# Patient Record
Sex: MALE | Race: WHITE | NOT HISPANIC OR LATINO | Employment: OTHER | URBAN - METROPOLITAN AREA
[De-identification: names, ages, dates, MRNs, and addresses within clinical notes are randomized per-mention and may not be internally consistent; named-entity substitution may affect disease eponyms.]

---

## 2017-03-14 ENCOUNTER — OFFICE VISIT (OUTPATIENT)
Dept: FAMILY MEDICINE | Facility: CLINIC | Age: 62
End: 2017-03-14
Payer: COMMERCIAL

## 2017-03-14 VITALS
BODY MASS INDEX: 26.72 KG/M2 | WEIGHT: 197 LBS | SYSTOLIC BLOOD PRESSURE: 115 MMHG | TEMPERATURE: 98.3 F | HEART RATE: 61 BPM | OXYGEN SATURATION: 96 % | DIASTOLIC BLOOD PRESSURE: 67 MMHG

## 2017-03-14 DIAGNOSIS — Z13.5 SCREENING FOR EYE CONDITION: ICD-10-CM

## 2017-03-14 DIAGNOSIS — G60.9 IDIOPATHIC PERIPHERAL NEUROPATHY: ICD-10-CM

## 2017-03-14 DIAGNOSIS — Z12.11 SCREEN FOR COLON CANCER: Primary | ICD-10-CM

## 2017-03-14 DIAGNOSIS — R73.9 ELEVATED BLOOD SUGAR: ICD-10-CM

## 2017-03-14 LAB — HBA1C MFR BLD: 5.8 % (ref 4.3–6)

## 2017-03-14 PROCEDURE — 83036 HEMOGLOBIN GLYCOSYLATED A1C: CPT | Performed by: FAMILY MEDICINE

## 2017-03-14 PROCEDURE — 99213 OFFICE O/P EST LOW 20 MIN: CPT | Performed by: FAMILY MEDICINE

## 2017-03-14 PROCEDURE — 36415 COLL VENOUS BLD VENIPUNCTURE: CPT | Performed by: FAMILY MEDICINE

## 2017-03-14 NOTE — NURSING NOTE
Chief Complaint   Patient presents with     NEUROPATHY     concern about neuropathy       Initial /67 (Cuff Size: Adult Large)  Pulse 61  Temp 98.3  F (36.8  C) (Oral)  Wt 197 lb (89.4 kg)  SpO2 96%  BMI 26.72 kg/m2 Estimated body mass index is 26.72 kg/(m^2) as calculated from the following:    Height as of 11/30/16: 6' (1.829 m).    Weight as of this encounter: 197 lb (89.4 kg).  Medication Reconciliation: leslie Ortiz MA

## 2017-03-14 NOTE — PROGRESS NOTES
Navneet,  I have reviewed the results of the laboratory tests that we recently ordered. All of the lab work performed was normal or considered normal for you.  Sincerely,   Bello Bruno

## 2017-03-14 NOTE — PATIENT INSTRUCTIONS
Your provider has referred you to: UMP: [facility] [phone]   [email or web address]  General Neurology and neuroopathy      Your provider has referred you to:  FMG: [facility] [phone]   [email or web address]      You will be contacted regarding the colonoscopy

## 2017-03-14 NOTE — PROGRESS NOTES
SUBJECTIVE:                                                    Navneet Toussaint is a 61 year old male who presents to clinic today for the following health issues:      Concern about neuropathy- increased burning and aching in feet, ankles, upper legs and hands.       Reviewed and updated as needed this visit by clinical staff  Tobacco  Allergies  Meds  Med Hx  Surg Hx  Fam Hx  Soc Hx      Reviewed and updated as needed this visit by Provider       --------------------------------------------------------------------------------------------------------------------------------------    SUBJECTIVE:  Navneet Toussaint is a 61 year old male who scheduled an appointment to discuss the following issues:  He was diagnosed with idiopathic neuropathy about 6 years ago.     He has had elevated blood sugar(s) in the past but no diabetes     He reports that the pain has flared up 2 day(s) ago and spread up his ankles and he also had an achy type pain in his bilateral(ly)  groin.    The pain in the feet is a burning and needle like pain.   The pain in the legs is more of an achy quality.    He has had a flare of his neuropathy a number of times and it usually settles down after 3-4 day(s).       Past Medical, social, family histories, medications, and allergies reviewed and updated      Current Outpatient Prescriptions:      TraMADol HCl 50 MG TBDP, Take 1-2 tablets by mouth every 4 hours as needed Maximum is 8 tabs in a day, Disp: 270 tablet, Rfl: 0     tadalafil (CIALIS) 20 MG tablet, Take 1 tablet (20 mg) by mouth daily as needed for erectile dysfunction, Disp: 10 tablet, Rfl: 3     fluticasone (FLONASE) 50 MCG/ACT nasal spray, Spray 2 sprays into both nostrils daily, Disp: 16 g, Rfl: 6     levothyroxine (SYNTHROID, LEVOTHROID) 112 MCG tablet, Take 1 tablet (112 mcg) by mouth daily, Disp: 90 tablet, Rfl: 2     tamsulosin (FLOMAX) 0.4 MG 24 hr capsule, Take 1 capsule (0.4 mg) by mouth daily, Disp: 90 capsule, Rfl: 1      finasteride (PROPECIA) 1 MG tablet, Take 1 tablet (1 mg) by mouth daily, Disp: 90 tablet, Rfl: 3     gabapentin (NEURONTIN) 800 MG tablet, Take 1 tablet (800 mg) by mouth 3 times daily, Disp: 270 tablet, Rfl: 3     gabapentin (NEURONTIN) 400 MG capsule, Take 1 capsule (400 mg) by mouth At Bedtime, Disp: 90 capsule, Rfl: 3     omeprazole (PRILOSEC) 40 MG capsule, Take 1 capsule (40 mg) by mouth daily Take 30-60 minutes before a meal., Disp: 30 capsule, Rfl: 4     ORDER FOR DME, Respironics REMSTAR 60 Series Auto CPAP 5-15cm H20, Nuance pillows w/medium pillows, Disp: , Rfl:      ORDER FOR DME, Respironics Auto CPAP 5-15cm H2O, Disp: , Rfl:     OBJECTIVE:  /67 (Cuff Size: Adult Large)  Pulse 61  Temp 98.3  F (36.8  C) (Oral)  Wt 197 lb (89.4 kg)  SpO2 96%  BMI 26.72 kg/m2    EXAM:  GENERAL APPEARANCE: healthy, alert and no distress  EXTREMITIES: intact monofilamet testing in the feet except the toes and the soles of the feet.  Normal DP pulse and there was brisk capillary refill(s)/          Results for orders placed or performed in visit on 11/30/16   Pain Drug Scr UR W Rptd Meds   Result Value Ref Range    Pain Drug SCR UR W RPTD Meds       FINAL  (Note)  ====================================================================  TOXASSURE COMP DRUG ANALYSIS,UR  ====================================================================  Test                             Result       Flag       Units  Drug Present and Declared for Prescription Verification   Tramadol                       PRESENT      EXPECTED   O-Desmethyltramadol            PRESENT      EXPECTED   N-Desmethyltramadol            PRESENT      EXPECTED    Source of tramadol is a prescription medication.    O-desmethyltramadol and N-desmethyltramadol are expected    metabolites of tramadol.     Gabapentin                     PRESENT      EXPECTED  ====================================================================  Test                      Result    Flag    Units      Ref Range   Creatinine              30               mg/dL      >=20  ====================================================================  Declared Medications:  The flagging and interpretation on this re port are based on the  following declared medications.  Unexpected results may arise from  inaccuracies in the declared medications.    **Note: The testing scope of this panel includes these medications:    Gabapentin  Tramadol    **Note: The testing scope of this panel does not include following  reported medications:    Finasteride (Propecia)  Fluticasone (Flonase)  Levothyroxine  Omeprazole (Prilosec)  Tadalafil (Cialis)  Tamsulosin (Flomax)  ====================================================================  For clinical consultation, please call [phone].  ====================================================================  Analysis performed by [employer] [place] [address]           ASSESSMENT/PLAN:  (Z12.11) Screen for colon cancer  (primary encounter diagnosis)  Comment:   Plan: GASTROENTEROLOGY ADULT REF PROCEDURE ONLY            (G60.9) Idiopathic peripheral neuropathy  Comment:   Plan: Hemoglobin A1c, NEUROLOGY ADULT REFERRAL            (R73.9) Elevated blood sugar  Comment:   Plan:     (Z13.5) Screening for eye condition  Comment:   Plan: OPTOMETRY REFERRAL

## 2017-03-14 NOTE — MR AVS SNAPSHOT
After Visit Summary   3/14/2017    Navneet Toussaint    MRN: [id number]           Patient Information     Date Of Birth          1955        Visit Information        Provider Department      3/14/2017 10:15 AM Bello Bruno MD [facility]        Today's Diagnoses     Screen for colon cancer    -  1    Idiopathic peripheral neuropathy        Elevated blood sugar        Screening for eye condition          Care Instructions    Your provider has referred you to: [facility]: Neurology [facility] [phone]   [email or web address]  General Neurology and neuroopathy      Your provider has referred you to:  FMG: [facility] [phone]   [email or web address]      You will be contacted regarding the colonoscopy        Follow-ups after your visit        Additional Services     GASTROENTEROLOGY ADULT REF PROCEDURE ONLY       Last Lab Result: Creatinine (mg/dL)       Date                     Value                 10/09/2015               1.03             ----------  Body mass index is 26.72 kg/(m^2).      Patient will be contacted to schedule procedure.     Please be aware that coverage of these services is subject to the terms and limitations of your health insurance plan.  Call member services at your health plan with any benefit or coverage questions.  Any procedures must be performed at a [place] facility OR coordinated by your clinic's referral office.    Please bring the following with you to your appointment:    (1) Any X-Rays, CTs or MRIs which have been performed.  Contact the facility where they were done to arrange for  prior to your scheduled appointment.    (2) List of current medications   (3) This referral request   (4) Any documents/labs given to you for this referral            NEUROLOGY ADULT REFERRAL       Your provider has referred you to: [facility]: Neurology [facility] [phone]   [email or web address]  General Neurology and neuroopathy    Reason for Referral: follow up on neuropathy    Please be aware that coverage of these services is subject to the terms and limitations of your health insurance plan.  Call member services at your health plan with any benefit or coverage questions.      Please bring the following with you to your appointment:    (1) Any X-Rays, CTs or MRIs which have been performed.  Contact the facility where they were done to arrange for  prior to your scheduled appointment.    (2) List of current medications  (3) This referral request   (4) Any documents/labs given to you for this referral            OPTOMETRY REFERRAL       Your provider has referred you to:  FMG: [facility] [phone]   [email or web address]    Please be aware that coverage of these services is subject to the terms and limitations of your health insurance plan.  Call member services at your health plan with any benefit or coverage questions.      Please bring the following to your appointment:  >>   Any x-rays, CTs or MRIs which have been performed.  Contact the facility where they were done to arrange for  prior to your scheduled appointment.  Any new CT, MRI or other procedures ordered by your specialist must be performed at a [place] facility or coordinated by your clinic's referral office.    >>   List of current medications   >>   This referral request   >>   Any documents/labs given to you for this referral                  Your next 10 appointments already scheduled     May 23, 2017  9:00 AM CDT   [facility]    [address] [facility] [address] [phone]           Bring a current list of meds and any records pertaining to this visit.  For Physicals, please bring immunization records and any forms  needing to be filled out.  Please arrive 10 minutes early to complete paperwork.              Who to contact     If you have questions or need follow up information about today's clinic visit or your schedule please contact [facility] directly at [phone].  Normal or non-critical lab and imaging results will be communicated to you by MyChart, letter or phone within 4 business days after the clinic has received the results. If you do not hear from us within 7 days, please contact the clinic through Sensoristhart or phone. If you have a critical or abnormal lab result, we will notify you by phone as soon as possible.  Submit refill requests through [employer] or call your pharmacy and they will forward the refill request to us. Please allow 3 business days for your refill to be completed.          Additional Information About Your Visit        [email or web address] Information     [employer] gives you secure access to your electronic health record. If you see a primary care provider, you can also send messages to your care team and make appointments. If you have questions, please call your primary care clinic.  If you do not have a primary care provider, please call [phone] and they will assist you.        Care EveryWhere ID     This is your Care EveryWhere ID. This could be used by other organizations to access your [place] medical records  [id number]        Your Vitals Were     Pulse Temperature Pulse Oximetry BMI (Body Mass Index)          61 98.3  F (36.8  C) (Oral) 96% 26.72 kg/m2         Blood Pressure from Last 3 Encounters:   03/14/17 115/67   11/30/16 119/68   05/09/16 129/70    Weight from Last 3 Encounters:   03/14/17 197 lb (89.4 kg)   11/30/16 200 lb (90.7 kg)   05/09/16 195 lb (88.5 kg)              We Performed the Following     GASTROENTEROLOGY ADULT REF PROCEDURE ONLY     Hemoglobin A1c     NEUROLOGY ADULT REFERRAL     OPTOMETRY REFERRAL        Primary Care Provider Office Phone # Fax #    Bello ALMODOVAR  MD Kiana [phone]       [facility] [address] [facility] [address]        Thank you!     Thank you for choosing [facility]  for your care. Our goal is always to provide you with excellent care. Hearing back from our patients is one way we can continue to improve our services. Please take a few minutes to complete the written survey that you may receive in the mail after your visit with us. Thank you!             Your Updated Medication List - Protect others around you: Learn how to safely use, store and throw away your medicines at www.disposemymeds.org.          This list is accurate as of: 3/14/17 10:58 AM.  Always use your most recent med list.                   Brand Name Dispense Instructions for use    finasteride 1 MG tablet    PROPECIA    90 tablet    Take 1 tablet (1 mg) by mouth daily       fluticasone 50 MCG/ACT spray    FLONASE    16 g    Spray 2 sprays into both nostrils daily       * gabapentin 800 MG tablet    NEURONTIN    270 tablet    Take 1 tablet (800 mg) by mouth 3 times daily       * gabapentin 400 MG capsule    NEURONTIN    90 capsule    Take 1 capsule (400 mg) by mouth At Bedtime       levothyroxine 112 MCG tablet    SYNTHROID/LEVOTHROID    90 tablet    Take 1 tablet (112 mcg) by mouth daily       omeprazole 40 MG capsule    priLOSEC    30 capsule    Take 1 capsule (40 mg) by mouth daily Take 30-60 minutes before a meal.       order for DME      Respironics Auto CPAP 5-15cm H2O       order for DME      Respironics REMSTAR 60 Series Auto CPAP 5-15cm H20, Nuance pillows w/medium pillows       tadalafil 20 MG tablet    CIALIS    10 tablet    Take 1 tablet (20 mg) by mouth daily as needed for erectile dysfunction       tamsulosin 0.4 MG capsule    FLOMAX    90 capsule    Take 1 capsule (0.4 mg) by mouth daily       TraMADol HCl 50 MG Tbdp     270 tablet    Take 1-2 tablets by mouth every 4 hours as needed Maximum is 8 tabs in a day       *  Notice:  This list has 2 medication(s) that are the same as other medications prescribed for you. Read the directions carefully, and ask your doctor or other care provider to review them with you.

## 2017-04-04 ENCOUNTER — RADIANT APPOINTMENT (OUTPATIENT)
Dept: GENERAL RADIOLOGY | Facility: CLINIC | Age: 62
End: 2017-04-04
Attending: FAMILY MEDICINE
Payer: COMMERCIAL

## 2017-04-04 ENCOUNTER — OFFICE VISIT (OUTPATIENT)
Dept: FAMILY MEDICINE | Facility: CLINIC | Age: 62
End: 2017-04-04
Payer: COMMERCIAL

## 2017-04-04 VITALS
HEART RATE: 51 BPM | DIASTOLIC BLOOD PRESSURE: 60 MMHG | TEMPERATURE: 97 F | SYSTOLIC BLOOD PRESSURE: 106 MMHG | BODY MASS INDEX: 26.66 KG/M2 | WEIGHT: 196.6 LBS

## 2017-04-04 DIAGNOSIS — M25.561 RIGHT KNEE PAIN, UNSPECIFIED CHRONICITY: Primary | ICD-10-CM

## 2017-04-04 DIAGNOSIS — M25.561 RIGHT KNEE PAIN, UNSPECIFIED CHRONICITY: ICD-10-CM

## 2017-04-04 PROCEDURE — 73562 X-RAY EXAM OF KNEE 3: CPT | Mod: RT

## 2017-04-04 PROCEDURE — 99213 OFFICE O/P EST LOW 20 MIN: CPT | Performed by: FAMILY MEDICINE

## 2017-04-04 NOTE — NURSING NOTE
Chief Complaint   Patient presents with     Knee Pain     right knee, sharp, X months/ gotten worse X 2 days       Initial /60  Pulse 51  Temp 97  F (36.1  C) (Oral)  Wt 196 lb 9.6 oz (89.2 kg)  BMI 26.66 kg/m2 Estimated body mass index is 26.66 kg/(m^2) as calculated from the following:    Height as of 11/30/16: 6' (1.829 m).    Weight as of this encounter: 196 lb 9.6 oz (89.2 kg).  Medication Reconciliation: leslie Cruz CMA

## 2017-04-04 NOTE — MR AVS SNAPSHOT
After Visit Summary   4/4/2017    Navneet Toussaint    MRN: [id number]           Patient Information     Date Of Birth          1955        Visit Information        Provider Department      4/4/2017 4:30 PM Bola Hays MD [facility]        Today's Diagnoses     Right knee pain, unspecified chronicity    -  1       Follow-ups after your visit        Additional Services     ORTHOPEDICS ADULT REFERRAL       Your provider has referred you to: FMG: [facility] [phone]   [email or web address]/[email or web address]/  FM[facility]: [facility] [phone]   [email or web address]    Please be aware that coverage of these services is subject to the terms and limitations of your health insurance plan.  Call member services at your health plan with any benefit or coverage questions.      Please bring the following to your appointment:    >>   Any x-rays, CTs or MRIs which have been performed.  Contact the facility where they were done to arrange for  prior to your scheduled appointment.    >>   List of current medications   >>   This referral request   >>   Any documents/labs given to you for this referral                  Your next 10 appointments already scheduled     Apr 05, 2017  3:00 PM CDT   New Visit with Robyn Burrows OD   [facility]    [address] [facility] [id number] [phone]            May 09, 2017   Procedure with Flex Jones MD   [facility] (--)    [address]  [facility] [id number] [phone]            May 16, 2017 10:00 AM CDT   (Arrive by 9:45 AM)   New Patient Visit with Kris Najera MD   [facility] Neurology ([email or web address] and [facility] [place])    [address] [facility] [address] [phone]            May 23, 2017  9:00 AM CDT   Office Visit with Bello ALMODOVAR  MD Kiana   [facility]    [address] [facility] [address] [phone]           Bring a current list of meds and any records pertaining to this visit.  For Physicals, please bring immunization records and any forms needing to be filled out.  Please arrive 10 minutes early to complete paperwork.              Who to contact     If you have questions or need follow up information about today's clinic visit or your schedule please contact [facility] directly at [phone].  Normal or non-critical lab and imaging results will be communicated to you by iKONVERSEhart, letter or phone within 4 business days after the clinic has received the results. If you do not hear from us within 7 days, please contact the clinic through Openbuildst or phone. If you have a critical or abnormal lab result, we will notify you by phone as soon as possible.  Submit refill requests through [employer] or call your pharmacy and they will forward the refill request to us. Please allow 3 business days for your refill to be completed.          Additional Information About Your Visit        iKONVERSEhart Information     [employer] gives you secure access to your electronic health record. If you see a primary care provider, you can also send messages to your care team and make appointments. If you have questions, please call your primary care clinic.  If you do not have a primary care provider, please call [phone] and they will assist you.        Care EveryWhere ID     This is your Care EveryWhere ID. This could be used by other organizations to access your [place] medical records  [id number]        Your Vitals Were     Pulse Temperature BMI (Body Mass Index)             51 97  F (36.1  C) (Oral) 26.66 kg/m2          Blood Pressure from Last 3 Encounters:   04/04/17 106/60   03/14/17 115/67   11/30/16 119/68    Weight from Last 3 Encounters:   04/04/17 196 lb 9.6 oz (89.2 kg)   03/14/17 197  lb (89.4 kg)   11/30/16 200 lb (90.7 kg)              We Performed the Following     ORTHOPEDICS ADULT REFERRAL          Today's Medication Changes          These changes are accurate as of: 4/4/17  4:56 PM.  If you have any questions, ask your nurse or doctor.               Start taking these medicines.        Dose/Directions    nabumetone 750 MG tablet   Commonly known as:  RELAFEN   Used for:  Right knee pain, unspecified chronicity   Started by:  Bola Hays MD        Dose:  750 mg   Take 1 tablet (750 mg) by mouth 2 times daily as needed for moderate pain Take with food   Quantity:  20 tablet   Refills:  1            Where to get your medicines      These medications were sent to [facility]/pharmacy #[id number] - [place], [place] - [address], [place] [address]     Phone:  [phone]     nabumetone 750 MG tablet                Primary Care Provider Office Phone # Fax #    Bello Bruno [phone]       [facility] [address] [facility] [address]        Thank you!     Thank you for choosing [facility]  for your care. Our goal is always to provide you with excellent care. Hearing back from our patients is one way we can continue to improve our services. Please take a few minutes to complete the written survey that you may receive in the mail after your visit with us. Thank you!             Your Updated Medication List - Protect others around you: Learn how to safely use, store and throw away your medicines at www.disposemymeds.org.          This list is accurate as of: 4/4/17  4:56 PM.  Always use your most recent med list.                   Brand Name Dispense Instructions for use    finasteride 1 MG tablet    PROPECIA    90 tablet    Take 1 tablet (1 mg) by mouth daily       fluticasone 50 MCG/ACT spray    FLONASE    16 g    Spray 2 sprays into both nostrils daily       * gabapentin 800 MG tablet     NEURONTIN    270 tablet    Take 1 tablet (800 mg) by mouth 3 times daily       * gabapentin 400 MG capsule    NEURONTIN    90 capsule    Take 1 capsule (400 mg) by mouth At Bedtime       levothyroxine 112 MCG tablet    SYNTHROID/LEVOTHROID    90 tablet    Take 1 tablet (112 mcg) by mouth daily       nabumetone 750 MG tablet    RELAFEN    20 tablet    Take 1 tablet (750 mg) by mouth 2 times daily as needed for moderate pain Take with food       omeprazole 40 MG capsule    priLOSEC    30 capsule    Take 1 capsule (40 mg) by mouth daily Take 30-60 minutes before a meal.       order for DME      Respironics Auto CPAP 5-15cm H2O       order for DME      Respironics REMSTAR 60 Series Auto CPAP 5-15cm H20, Nuance pillows w/medium pillows       tadalafil 20 MG tablet    CIALIS    10 tablet    Take 1 tablet (20 mg) by mouth daily as needed for erectile dysfunction       tamsulosin 0.4 MG capsule    FLOMAX    90 capsule    Take 1 capsule (0.4 mg) by mouth daily       TraMADol HCl 50 MG Tbdp     270 tablet    Take 1-2 tablets by mouth every 4 hours as needed Maximum is 8 tabs in a day       * Notice:  This list has 2 medication(s) that are the same as other medications prescribed for you. Read the directions carefully, and ask your doctor or other care provider to review them with you.

## 2017-04-04 NOTE — PROGRESS NOTES
SUBJECTIVE:  Navneet Toussaint, a 61 year old male scheduled an appointment to discuss the following issues:  right knee pain ongoing on/off months. No specific injury. No swelling. No locking. Worse with walking.   No brace. Hip/ankle ok. Tramadol/gabepentin for neuropathy.   No heat/ice. No family history joint replacement. left knee pain overall ok.   No history dvt. No calf/posterior thigh pain.   Medical, social, surgical, and family histories reviewed.    ROS:    OBJECTIVE:  /60  Pulse 51  Temp 97  F (36.1  C) (Oral)  Wt 196 lb 9.6 oz (89.2 kg)  BMI 26.66 kg/m2  EXAM:  GENERAL APPEARANCE: healthy, alert and no distress  RESP: lungs clear to auscultation - no rales, rhonchi or wheezes  CV: regular rates and rhythm, normal S1 S2, no S3 or S4 and no murmur, click or rub -  ABDOMEN:  soft, nontender, no HSM or masses and bowel sounds normal  MS: extremities normal- no gross deformities noted, no evidence of inflammation in joints, FROM in all extremities.  MS:tenderness right medial joint space. No swelling/redness. Pain with hyperflexion/extension  SKIN: no suspicious lesions or rashes  NEURO: Normal strength and tone, sensory exam grossly normal, mentation intact and speech normal  PSYCH: mentation appears normal and affect normal/bright    ASSESSMENT / PLAN:  (M25.561) Right knee pain, unspecified chronicity  (primary encounter diagnosis)  Comment: possible meniscal tear per xray/ history/ exam  Plan: XR Knee Right 3 Views, ORTHOPEDICS ADULT         REFERRAL, nabumetone (RELAFEN) 750 MG tablet        Follow-up ortho. Short course relafen/heat /brace and stretching. Expected course and warning signs reviewed. Reveiwed risks and side effects of medication  Call/email with questions/concerns    Bola Hays MD

## 2017-04-05 ENCOUNTER — OFFICE VISIT (OUTPATIENT)
Dept: OPTOMETRY | Facility: CLINIC | Age: 62
End: 2017-04-05
Payer: COMMERCIAL

## 2017-04-05 DIAGNOSIS — Z96.1 PSEUDOPHAKIA: ICD-10-CM

## 2017-04-05 DIAGNOSIS — H26.9 CATARACT: ICD-10-CM

## 2017-04-05 DIAGNOSIS — H52.203 ASTIGMATISM OF BOTH EYES: Primary | ICD-10-CM

## 2017-04-05 DIAGNOSIS — H52.4 PRESBYOPIA: ICD-10-CM

## 2017-04-05 PROCEDURE — 92015 DETERMINE REFRACTIVE STATE: CPT | Performed by: OPTOMETRIST

## 2017-04-05 PROCEDURE — 92014 COMPRE OPH EXAM EST PT 1/>: CPT | Performed by: OPTOMETRIST

## 2017-04-05 ASSESSMENT — REFRACTION_MANIFEST
OD_ADD: +2.50
OS_AXIS: 148
OS_ADD: +2.50
OD_AXIS: 010
OD_CYLINDER: +1.25
METHOD_AUTOREFRACTION: 1
OD_SPHERE: -0.50
OS_CYLINDER: +0.75
OD_CYLINDER: +1.00
OS_SPHERE: +0.25
OD_AXIS: 005
OD_SPHERE: -0.50
OS_CYLINDER: +1.00
OS_AXIS: 155
OS_SPHERE: PLANO

## 2017-04-05 ASSESSMENT — REFRACTION_WEARINGRX
SPECS_TYPE: OTC READERS
OD_SPHERE: +1.50
OD_CYLINDER: SPHERE
OS_CYLINDER: SPHERE
OS_SPHERE: +1.50

## 2017-04-05 ASSESSMENT — VISUAL ACUITY
OD_SC: 20/30
CORRECTION_TYPE: GLASSES
OS_SC: 20/40
METHOD: SNELLEN - LINEAR
OD_CC: 20/50-1
OS_CC: 20/50
OD_SC+: -2

## 2017-04-05 ASSESSMENT — CUP TO DISC RATIO
OD_RATIO: 0.3
OS_RATIO: 0.3

## 2017-04-05 ASSESSMENT — SLIT LAMP EXAM - LIDS
COMMENTS: NORMAL
COMMENTS: NORMAL

## 2017-04-05 ASSESSMENT — EXTERNAL EXAM - LEFT EYE: OS_EXAM: NORMAL

## 2017-04-05 ASSESSMENT — TONOMETRY
IOP_METHOD: APPLANATION
OD_IOP_MMHG: 12
OS_IOP_MMHG: 12

## 2017-04-05 ASSESSMENT — CONF VISUAL FIELD
OS_NORMAL: 1
OD_NORMAL: 1

## 2017-04-05 ASSESSMENT — EXTERNAL EXAM - RIGHT EYE: OD_EXAM: NORMAL

## 2017-04-05 NOTE — PROGRESS NOTES
Chief Complaint   Patient presents with     COMPREHENSIVE EYE EXAM         Last Eye Exam: 1-  Dilated Previously: Yes    What are you currently using to see? otc  readers       Distance Vision Acuity: Satisfied with vision    Near Vision Acuity: Satisfied with vision while reading  With otc readers    Eye Comfort: good  Do you use eye drops? : No  Occupation or Hobbies: [employer], [employer]          Medical, surgical and family histories reviewed and updated 4/5/2017.       OBJECTIVE: See Ophthalmology exam    ASSESSMENT:    ICD-10-CM    1. Astigmatism of both eyes H52.203 EYE EXAM (SIMPLE-NONBILLABLE)     REFRACTION   2. Presbyopia H52.4 EYE EXAM (SIMPLE-NONBILLABLE)     REFRACTION   3. Cataract, mild-mod, os H26.9 EYE EXAM (SIMPLE-NONBILLABLE)     REFRACTION   4. Pseudophakia, od Z96.1 EYE EXAM (SIMPLE-NONBILLABLE)     REFRACTION      PLAN:     Patient Instructions   Patient was advised of today's exam findings.  Mild glasses prescription optional  Monitor mild cataracts left eye  Return in 1 year for eye exam    Robyn Burrows O.D.  [facility]   [id number] [place] [address] [phone]        Bijan Miller and Wanda  [facility]   [address] [place] [address] [phone]

## 2017-04-05 NOTE — PATIENT INSTRUCTIONS
Patient was advised of today's exam findings.  Mild glasses prescription optional  Monitor mild cataracts left eye  Return in 1 year for eye exam    Robyn Burrows O.D.  [facility]   [address] [place] [address] [phone]        Bijan Miller and Wanda  [facility]   [address] [place], [place] [address] [phone]

## 2017-04-05 NOTE — MR AVS SNAPSHOT
After Visit Summary   4/5/2017    Navneet Toussaint    MRN: [id number]           Patient Information     Date Of Birth          1955        Visit Information        Provider Department      4/5/2017 3:00 PM Robyn Burrows OD [facility]        Care Instructions    Patient was advised of today's exam findings.  Mild glasses prescription optional  Monitor mild cataracts left eye  Return in 1 year for eye exam    Robyn Burrows O.D.  [facility]   [address] [place] [address]  [phone]        Bijan Miller and Wanda  [facility]   [address]  [place] [address]   [phone]                   Follow-ups after your visit        Your next 10 appointments already scheduled     Apr 13, 2017  3:30 PM CDT   [facility] Visit with Rodney Carl MD   [facility]    [address] [facility] [id number] [phone]            May 09, 2017   Procedure with Flex Jones MD   [facility]    [address]  [facility] [address]   [phone]            May 16, 2017 10:00 AM CDT   (Arrive by 9:45 AM)   New Patient Visit with Kris Najera MD   [facility]    [address] [facility] [address]   [phone]            May 23, 2017  9:00 AM CDT   Office Visit with Bello Bruno MD   [facility]    [address] [facility] [id number] [phone]           Bring a current list of meds and any records pertaining to this visit.  For Physicals, please bring immunization records and any forms needing to be filled out.  Please arrive 10 minutes early to complete paperwork.              Who to contact     If you have questions or need follow up information about today's clinic visit or your schedule please contact [facility] directly at [phone].  Normal or non-critical lab and imaging results will be communicated to you by MyChart, letter or phone within 4 business days after the clinic has received the results. If you do not hear from us within 7 days, please contact the clinic through [employer] or phone. If you have a critical or abnormal lab result, we will notify you by phone as soon as possible.  Submit refill requests through [employer] or call your pharmacy and they will forward the refill request to us. Please allow 3 business days for your refill to be completed.          Additional Information About Your Visit        [employer] Information     [employer] gives you secure access to your electronic health record. If you see a primary care provider, you can also send messages to your care team and make appointments. If you have questions, please call your primary care clinic.  If you do not have a primary care provider, please call [phone] and they will assist you.        Care EveryWhere ID     This is your Care EveryWhere ID. This could be used by other organizations to access your [facility] medical records  [id number]         Blood Pressure from Last 3 Encounters:   04/04/17 106/60   03/14/17 115/67   11/30/16 119/68    Weight from Last 3 Encounters:   04/04/17 89.2 kg (196 lb 9.6 oz)   03/14/17 89.4 kg (197 lb)   11/30/16 90.7 kg (200 lb)              Today, you had the following     No orders found for display       Primary Care Provider Office Phone # Fax #    Bello Bruno [phone]       [facility] [address] [facility] [address]        Thank you!     Thank you for choosing [facility]  for your care. Our goal is always to provide you with excellent care. Hearing back from our patients is one way we can continue to improve our services. Please take a few minutes to complete the written survey that you may receive in the mail after your visit with us. Thank  you!             Your Updated Medication List - Protect others around you: Learn how to safely use, store and throw away your medicines at www.disposemymeds.org.          This list is accurate as of: 4/5/17  4:24 PM.  Always use your most recent med list.                   Brand Name Dispense Instructions for use    finasteride 1 MG tablet    PROPECIA    90 tablet    Take 1 tablet (1 mg) by mouth daily       fluticasone 50 MCG/ACT spray    FLONASE    16 g    Spray 2 sprays into both nostrils daily       * gabapentin 800 MG tablet    NEURONTIN    270 tablet    Take 1 tablet (800 mg) by mouth 3 times daily       * gabapentin 400 MG capsule    NEURONTIN    90 capsule    Take 1 capsule (400 mg) by mouth At Bedtime       levothyroxine 112 MCG tablet    SYNTHROID/LEVOTHROID    90 tablet    Take 1 tablet (112 mcg) by mouth daily       nabumetone 750 MG tablet    RELAFEN    20 tablet    Take 1 tablet (750 mg) by mouth 2 times daily as needed for moderate pain Take with food       omeprazole 40 MG capsule    priLOSEC    30 capsule    Take 1 capsule (40 mg) by mouth daily Take 30-60 minutes before a meal.       order for DME      Respironics Auto CPAP 5-15cm H2O       order for DME      Respironics REMSTAR 60 Series Auto CPAP 5-15cm H20, Nuance pillows w/medium pillows       tadalafil 20 MG tablet    CIALIS    10 tablet    Take 1 tablet (20 mg) by mouth daily as needed for erectile dysfunction       tamsulosin 0.4 MG capsule    FLOMAX    90 capsule    Take 1 capsule (0.4 mg) by mouth daily       TraMADol HCl 50 MG Tbdp     270 tablet    Take 1-2 tablets by mouth every 4 hours as needed Maximum is 8 tabs in a day       * Notice:  This list has 2 medication(s) that are the same as other medications prescribed for you. Read the directions carefully, and ask your doctor or other care provider to review them with you.

## 2017-04-07 ENCOUNTER — OFFICE VISIT (OUTPATIENT)
Dept: ORTHOPEDICS | Facility: CLINIC | Age: 62
End: 2017-04-07
Payer: COMMERCIAL

## 2017-04-07 VITALS — HEIGHT: 72 IN | WEIGHT: 198.4 LBS | RESPIRATION RATE: 16 BRPM | BODY MASS INDEX: 26.87 KG/M2

## 2017-04-07 DIAGNOSIS — M25.561 ACUTE PAIN OF RIGHT KNEE: ICD-10-CM

## 2017-04-07 DIAGNOSIS — M11.261 CHONDROCALCINOSIS OF RIGHT KNEE: ICD-10-CM

## 2017-04-07 DIAGNOSIS — M17.11 PRIMARY OSTEOARTHRITIS OF RIGHT KNEE: Primary | ICD-10-CM

## 2017-04-07 PROCEDURE — 99244 OFF/OP CNSLTJ NEW/EST MOD 40: CPT | Mod: 25 | Performed by: ORTHOPAEDIC SURGERY

## 2017-04-07 PROCEDURE — 20610 DRAIN/INJ JOINT/BURSA W/O US: CPT | Mod: RT | Performed by: ORTHOPAEDIC SURGERY

## 2017-04-07 ASSESSMENT — PAIN SCALES - GENERAL: PAINLEVEL: MODERATE PAIN (4)

## 2017-04-07 NOTE — PROGRESS NOTES
The patient's right knee was prepped with betadine solution after verification of allergies. Area approximately 10 cm x 10 cm prepped in a sterile fashion. After injection, betadine removed with soap and water and band-aids applied.    4cc Lidocaine 1% NDC 0314-2245-82, LOT -[id number],  [id number]  3cc Bupivacaine 0.25% NDC 1550-3378-85, LOT -DK,  2017  1cc Depo Medrol 80 mg/ml NDC 9378-0465-58, LOT [id number],  2019   injected into patient's right Knee by:  Conner Liu PA-C, CAQ (Ortho)  Supervising Physician: Anuel Cross M.D., M.S.  Dept. of Orthopaedic Surgery  [facility]

## 2017-04-07 NOTE — NURSING NOTE
Chief Complaint   Patient presents with     Knee Pain     Right knee pain. Onset: a couple of months. Pain gradually came on but recently it has gotten worse. He is unable to walk any distance without it getting quite painful. Pain starts inferior/medial and radiates out to anterior knee. He was seen my Dr. Hays and was given an anti-inflammatory but that made him nauseated in the past so he didn't take it. He has tried knee sleeves but that makes it worse.       Initial Resp 16  Ht 1.829 m (6')  Wt 90 kg (198 lb 6.4 oz)  BMI 26.91 kg/m2 Estimated body mass index is 26.91 kg/(m^2) as calculated from the following:    Height as of this encounter: 1.829 m (6').    Weight as of this encounter: 90 kg (198 lb 6.4 oz).  Medication Reconciliation: leslie Quinteros Certified Medical Assistant

## 2017-04-07 NOTE — PROGRESS NOTES
CHIEF COMPLAINT:   Chief Complaint   Patient presents with     Knee Pain     Right knee pain. Onset: a couple of months. Pain gradually came on but recently it has gotten worse. He is unable to walk any distance without it getting quite painful. Pain starts inferior/medial and radiates out to anterior knee. He was seen my Dr. Hays and was given an anti-inflammatory but that made him nauseated in the past so he didn't take it. He has tried knee sleeves but that makes it worse.     Navneet Toussaint is seen today in the [facility] for evaluation of right knee pain at the request of Dr. Bola Hays MD        HISTORY OF PRESENT ILLNESS    Navneet Toussaint is a 61 year old male seen for evaluation of ongoing right knee pain with no known injury.   Pain has been present for a couple months. Gradual onset, however worsened within the last week. No change in activity. Pain is located over the anteromedial knee. With any significant walking, he will feel his knee is inflamed. Denies any swelling. Denies locking and catching. no pain at rest, pain at night. Taken ibuprofen for pain, 800 mg once daily; he states this does help. No other joint problems. Denies history of gout. Denies numbness and tingling. No low back or hip pain. Supervisory position. Was seen by Dr. Hays and was given anti-inflammatory medication, however reported nausea in the past with those so he did not take them.  Tried knee sleeves, which aggravated his knees even more.     Present symptoms: moderate pain.   Mild swelling. No locking, catching, giving way.  Pain severity: 4/10  Frequency of symptoms: frequently  Exacerbating Factors: weight bearing  Relieving Factors: rest  Night Pain: Yes  Pain while at rest: No   Numbness or tingling: No   Patient has tried:     NSAIDS: Yes ; ibuprofen 800 mg.     Physical Therapy: No      Activity modification: No      Bracing: Yes      Injections: No      Ice: No      Assistive device:  Yes      Other: none    Orthopaedic PMH: none    Other PMH:  has a past medical history of Cataract, mod, od; mild-mod, os (2/24/2015); Hypothyroid; and Social anxiety disorder. He also has no past medical history of Cancer (H); Cerebral infarction (H); Congestive heart failure (H); Congestive heart failure, unspecified; COPD (chronic obstructive pulmonary disease) (H); Coronary artery disease; CVA (cerebral infarction); Depressive disorder; Heart disease; History of blood transfusion; or Uncomplicated asthma.  Patient Active Problem List    Diagnosis Date Noted     Lumbar radiculopathy 12/07/2015     Priority: Medium     Numbness in feet 12/07/2015     Priority: Medium     IGT (impaired glucose tolerance) 10/16/2015     Priority: Medium     High triglycerides 09/28/2015     Priority: Medium     Elevated prostate specific antigen (PSA), neg prostate bx 09/02/2015     Priority: Medium     Chronic pain syndrome 04/25/2016     Patient is followed by SHWETA WARNER for ongoing prescription of pain medication.  All refills should be approved by this provider, or covering partner.    Medication(s): tramadol .   Maximum quantity per month: 90  Clinic visit frequency required: Q 6  months     Controlled substance agreement on file: Yes 11/30/2016      Pain Clinic evaluation in the past: Yes in 2011 with Dr Madison    DIRE Total Score(s):  No flowsheet data found.    Last [facility] website verification:  none   [email or web address]         Erectile dysfunction 05/28/2015     Pseudophakia, od 03/09/2015     Cataract, mild-mod, os 02/24/2015     Idiopathic peripheral neuropathy 04/01/2011     Diagnosed by Dr Madison at the [facility]       Advanced directives, counseling/discussion 03/15/2011     Discussed Advance Directive planning with patient; information given to patient to review.    Mona Aparicio MA         Overweight (BMI 25.0-29.9) 03/15/2011     Hypothyroidism 02/02/2011     CARDIOVASCULAR SCREENING; LDL GOAL LESS THAN  160 10/31/2010     Male pattern baldness 06/07/2010     Social anxiety disorder        Surgical Hx:  has a past surgical history that includes vasectomy; SURGICAL PATHOLOGY; colonoscopy (2005); biopsy (August 2015); and cataract iol, rt/lt (Right, 03/2015).    Medications:   Current Outpatient Prescriptions:      nabumetone (RELAFEN) 750 MG tablet, Take 1 tablet (750 mg) by mouth 2 times daily as needed for moderate pain Take with food, Disp: 20 tablet, Rfl: 1     TraMADol HCl 50 MG TBDP, Take 1-2 tablets by mouth every 4 hours as needed Maximum is 8 tabs in a day, Disp: 270 tablet, Rfl: 0     tadalafil (CIALIS) 20 MG tablet, Take 1 tablet (20 mg) by mouth daily as needed for erectile dysfunction, Disp: 10 tablet, Rfl: 3     fluticasone (FLONASE) 50 MCG/ACT nasal spray, Spray 2 sprays into both nostrils daily, Disp: 16 g, Rfl: 6     levothyroxine (SYNTHROID, LEVOTHROID) 112 MCG tablet, Take 1 tablet (112 mcg) by mouth daily, Disp: 90 tablet, Rfl: 2     tamsulosin (FLOMAX) 0.4 MG 24 hr capsule, Take 1 capsule (0.4 mg) by mouth daily, Disp: 90 capsule, Rfl: 1     finasteride (PROPECIA) 1 MG tablet, Take 1 tablet (1 mg) by mouth daily, Disp: 90 tablet, Rfl: 3     gabapentin (NEURONTIN) 800 MG tablet, Take 1 tablet (800 mg) by mouth 3 times daily, Disp: 270 tablet, Rfl: 3     gabapentin (NEURONTIN) 400 MG capsule, Take 1 capsule (400 mg) by mouth At Bedtime, Disp: 90 capsule, Rfl: 3     omeprazole (PRILOSEC) 40 MG capsule, Take 1 capsule (40 mg) by mouth daily Take 30-60 minutes before a meal., Disp: 30 capsule, Rfl: 4     ORDER FOR DME, Respironics REMSTAR 60 Series Auto CPAP 5-15cm H20, Nuance pillows w/medium pillows, Disp: , Rfl:      ORDER FOR DME, Respironics Auto CPAP 5-15cm H2O, Disp: , Rfl:     Allergies:   Allergies   Allergen Reactions     Nkda [No Known Drug Allergies]        Social Hx: Works at the [employer]   reports that he quit smoking about 37 years ago. His smoking use included Cigarettes. He  has never used smokeless tobacco. He reports that he does not drink alcohol or use illicit drugs.    Family Hx: family history includes Alzheimer Disease in his mother; DIABETES in his mother; HEART DISEASE in his brother; Respiratory in his father. There is no history of CANCER, Hypertension, CEREBROVASCULAR DISEASE, Thyroid Disease, Glaucoma, or Macular Degeneration.     This document serves as a record of the services and decisions personally performed and made by Anuel Cross MD. It was created on his behalf by Shannan Agarwal, a trained medical scribe. The creation of this document is based the provider's statements to the medical scribe.    Scribe Shannan Agarwal 9:51 AM 4/7/2017     REVIEW OF SYSTEMS: 10 point ROS neg other than the symptoms noted above in the HPI and PMH. Notables include  CONSTITUTIONAL:NEGATIVE for fever, chills, change in weight  INTEGUMENTARY/SKIN: NEGATIVE for worrisome rashes, moles or lesions  MUSCULOSKELETAL:See HPI above  NEURO: NEGATIVE for weakness, dizziness or paresthesias    PHYSICAL EXAM:  Resp 16  Ht 1.829 m (6')  Wt 90 kg (198 lb 6.4 oz)  BMI 26.91 kg/m2   GENERAL APPEARANCE: healthy, alert, no distress  SKIN: no suspicious lesions or rashes  NEURO: Normal strength and tone, mentation intact and speech normal  PSYCH:  mentation appears normal and affect normal, not anxious  RESPIRATORY: No increased work of breathing.  HANDS: no clubbing, nail pitting.    BILATERAL LOWER EXTREMITIES:  Gait: antalgic favoring right  Alignment: varus  No gross deformities or masses.  No Quad atrophy, strength normal.  Intact sensation deep peroneal nerve, superficial peroneal nerve, med/lat tibial nerve, sural nerve, saphenous nerve  Intact EHL, EDL, TA, FHL, GS, quadriceps hamstrings and hip flexors  Toes warm and well perfused, brisk capillary refill. Palpable 2+ dp pulses.  Bilateral calf soft and nttp or squeeze.  No palpable popliteal lymphadenopathy.  DTRs: achilles 2+, patella 2+.  Edema:  none  Hips with full, pain-free motion. No irritability with flexion, adduction, and internal rotation.    LEFT KNEE EXAM:    Skin: intact, no ecchymosis or erythema  Squat: 100 %, not limited by pain.     ROM: 0 extension to 140 flexion  Tight hamstrings on straight leg raise.  Effusion: none  Tender: NTTP med/lat joint line, anterior or posterior knee  McMurrays: negative    MCL: stable, and non-painful at both 0 and 30 degrees knee flexion  Varus stress: stable, and non-painful at both 0 and 30 degrees knee flexion  Lachmans: neg, firm endpoint  Posterior Drawer stable  Patellofemoral joint:                Apprehension: negative              Crepitations: minimal    RIGHT KNEE EXAM:    Skin: intact, no ecchymosis or erythema  Squat: 100 %, not limited by pain.     ROM: 0 extension to 140 flexion with discomfort   Tight hamstrings on straight leg raise.  Effusion: trace  Tender: medial joint line, lateral joint line   NTTP anterior or posterior knee  McMurrays: negative    MCL: stable, and non-painful at both 0 and 30 degrees knee flexion  Varus stress: stable, and non-painful at both 0 and 30 degrees knee flexion  Lachmans: neg, firm endpoint  Posterior Drawer stable  Patellofemoral joint:                Apprehension: negative              Crepitations: minimal    X-RAY:  3 views right knee from 4/4/2017 were reviewed in clinic today. On my review, no obvious fractures or dislocations. Medial and lateral chondrocalcinosis. Mild patello-femoral and mild-moderate medial space narrowing.    Impression:   61 year old male with right knee pain, chondrocalcinosis, mild osteoarthritis.    Plan:   * Reviewed imaging with patient. Also clinical exam findings. Mild osteoarthritis, chondrocalcinosis often seen in pseudogout.  * both pseudogout and arthritis can cause onset of pain, swelling.  * Explained that pseudogout can cause the knee to become inflamed.  * Discussed if symptoms continue or are worsened, I would  "recommend he have an MRI for further diagnostics.      Non-surgical treatment:    * rest, sitting  * Activity modification - avoid impact activities or activities that aggravate symptoms.  * NSAIDS (non-steroidal anti-inflammatory medications; e.g. Aleve, advil, motrin, ibuprofen) - regular use for inflammation ( twice daily or three times daily), with food, as long as no contra-indications Please discuss with primary care doctor if needed  * ice, 15-20 minutes at a time several times a day or as needed.  * Strengthening of quadriceps muscles  * home exercise program for strengthening, stretching and range of motion exercises of legs; consider Physical Therapy in future.  * Tylenol as needed for pain, consider Tylenol arthritis or similar  * Weight loss: Weight loss:  Body mass index is 26.91 kg/(m^2).. weight loss benefits, not only for the current pain symptoms, but also overall health. Recommend a good diet plan that works for the patient, with the assistance of a dietician or primary care doctor as needed. Also, a good, low-impact exercise program for at least 20 minutes per day, 3 times per week, such as exercise bike, elliptical , or pool.  * Exercise: low impact such as stationary bike, elliptical, pool.  * Injections: cortisone versus viscosupplementation (hyaluronic acid, \"rooster comb\", \"gel shots\"); risks and perceived benefits discussed today. Patient elects to proceed.  * Bracing: bracing the knee may offer some relief of symptoms when worn and provide some stability.  * over the counter supplements such as glucosamine and chondroitin sulfate may help with joint pain.  * topical ointments may help as well    * return to clinic as needed.    PROCEDURE NOTE:  The risks, perceived benefits and potential complications (including but not limited to: bleeding, infection, pain, scar, damage to adjacent structures, atrophy or necrosis of soft tissue, skin blanching, failure to relieve symptoms, worsening " of symptoms, allergic reaction) of injection were discussed with the patient. Questions were addressed and answered.The patient elected to proceed. Written informed consent was obtained. The correct procedural site was identified and confirmed. A Right Knee intraarticular injection was performed using 1mL Depo Medrol 80mg per mL and 7mL (4mL 1% lidocaine, 3mL 0.25% marcaine)  of local anesthetic after sterile prep, to the correct procedural site. Sterile bandaid applied. This was tolerated well by the patient. No apparent complications. Did also discuss that if diabetic, recommend close monitoring of blood sugars over the next week as cortisone injections can temporarily elevate blood sugars.    The information in this document, created by a scribe for me, accurately reflects the services I personally performed and the decisions made by me. I have reviewed and approved this document for accuracy.      Anuel rCoss M.D., M.S.  Dept. of Orthopaedic Surgery  [facility]      The information in this document, created by a scribe for me, accurately reflects the services I personally performed and the decisions made by me. I have reviewed and approved this document for accuracy.      Anuel Cross M.D., M.S.  Dept. of Orthopaedic Surgery  [facility]

## 2017-04-07 NOTE — LETTER
2017       RE: Navneet Toussaint  [address]  [place] [address]           Dear Colleague,    Thank you for referring your patient, Navneet Toussaint, to the [facility]. Please see a copy of my visit note below.    The patient's right knee was prepped with betadine solution after verification of allergies. Area approximately 10 cm x 10 cm prepped in a sterile fashion. After injection, betadine removed with soap and water and band-aids applied.    4cc Lidocaine 1% NDC 3120-0325-85, LOT -[id number],  [id number]  3cc Bupivacaine 0.25% NDC 1306-5199-06, LOT -DK,  2017  1cc Depo Medrol 80 mg/ml NDC 8950-5030-68, LOT [id number],  2019   injected into patient's right Knee by:  Conner Liu PA-C, CAQ (Ortho)  Supervising Physician: Anuel Cross M.D., M.S.  Dept. of Orthopaedic Surgery  [facility]              CHIEF COMPLAINT:   Chief Complaint   Patient presents with     Knee Pain     Right knee pain. Onset: a couple of months. Pain gradually came on but recently it has gotten worse. He is unable to walk any distance without it getting quite painful. Pain starts inferior/medial and radiates out to anterior knee. He was seen my Dr. Hays and was given an anti-inflammatory but that made him nauseated in the past so he didn't take it. He has tried knee sleeves but that makes it worse.     Navneet Toussaint is seen today in the [facility] for evaluation of right knee pain at the request of Dr. Bola Hays MD        HISTORY OF PRESENT ILLNESS    Navneet Toussaint is a 61 year old male seen for evaluation of ongoing right knee pain with no known injury.   Pain has been present for a couple months. Gradual onset, however worsened within the last week. No change in activity. Pain is located over the anteromedial knee. With any significant walking, he will feel his knee is inflamed. Denies any swelling. Denies locking and catching. no pain at rest, pain  at night. Taken ibuprofen for pain, 800 mg once daily; he states this does help. No other joint problems. Denies history of gout. Denies numbness and tingling. No low back or hip pain. Supervisory position. Was seen by Dr. Hays and was given anti-inflammatory medication, however reported nausea in the past with those so he did not take them.  Tried knee sleeves, which aggravated his knees even more.     Present symptoms: moderate pain.   Mild swelling. No locking, catching, giving way.  Pain severity: 4/10  Frequency of symptoms: frequently  Exacerbating Factors: weight bearing  Relieving Factors: rest  Night Pain: Yes  Pain while at rest: No   Numbness or tingling: No   Patient has tried:     NSAIDS: Yes ; ibuprofen 800 mg.     Physical Therapy: No      Activity modification: No      Bracing: Yes      Injections: No      Ice: No      Assistive device:  Yes     Other: none    Orthopaedic PMH: none    Other PMH:  has a past medical history of Cataract, mod, od; mild-mod, os (2/24/2015); Hypothyroid; and Social anxiety disorder. He also has no past medical history of Cancer (H); Cerebral infarction (H); Congestive heart failure (H); Congestive heart failure, unspecified; COPD (chronic obstructive pulmonary disease) (H); Coronary artery disease; CVA (cerebral infarction); Depressive disorder; Heart disease; History of blood transfusion; or Uncomplicated asthma.  Patient Active Problem List    Diagnosis Date Noted     Lumbar radiculopathy 12/07/2015     Priority: Medium     Numbness in feet 12/07/2015     Priority: Medium     IGT (impaired glucose tolerance) 10/16/2015     Priority: Medium     High triglycerides 09/28/2015     Priority: Medium     Elevated prostate specific antigen (PSA), neg prostate bx 09/02/2015     Priority: Medium     Chronic pain syndrome 04/25/2016     Patient is followed by SHWETA WARNER for ongoing prescription of pain medication.  All refills should be approved by this provider, or  covering partner.    Medication(s): tramadol .   Maximum quantity per month: 90  Clinic visit frequency required: Q 6  months     Controlled substance agreement on file: Yes 11/30/2016      Pain Clinic evaluation in the past: Yes in 2011 with Dr Gricelda DE LA CRUZ Total Score(s):  No flowsheet data found.    Last [facility] website verification:  none   [email or web address]         Erectile dysfunction 05/28/2015     Pseudophakia, od 03/09/2015     Cataract, mild-mod, os 02/24/2015     Idiopathic peripheral neuropathy 04/01/2011     Diagnosed by Dr Madison at the [facility]       Advanced directives, counseling/discussion 03/15/2011     Discussed Advance Directive planning with patient; information given to patient to review.    Mona Aparicio MA         Overweight (BMI 25.0-29.9) 03/15/2011     Hypothyroidism 02/02/2011     CARDIOVASCULAR SCREENING; LDL GOAL LESS THAN 160 10/31/2010     Male pattern baldness 06/07/2010     Social anxiety disorder        Surgical Hx:  has a past surgical history that includes vasectomy; SURGICAL PATHOLOGY; colonoscopy (2005); biopsy (August 2015); and cataract iol, rt/lt (Right, 03/2015).    Medications:   Current Outpatient Prescriptions:      nabumetone (RELAFEN) 750 MG tablet, Take 1 tablet (750 mg) by mouth 2 times daily as needed for moderate pain Take with food, Disp: 20 tablet, Rfl: 1     TraMADol HCl 50 MG TBDP, Take 1-2 tablets by mouth every 4 hours as needed Maximum is 8 tabs in a day, Disp: 270 tablet, Rfl: 0     tadalafil (CIALIS) 20 MG tablet, Take 1 tablet (20 mg) by mouth daily as needed for erectile dysfunction, Disp: 10 tablet, Rfl: 3     fluticasone (FLONASE) 50 MCG/ACT nasal spray, Spray 2 sprays into both nostrils daily, Disp: 16 g, Rfl: 6     levothyroxine (SYNTHROID, LEVOTHROID) 112 MCG tablet, Take 1 tablet (112 mcg) by mouth daily, Disp: 90 tablet, Rfl: 2     tamsulosin (FLOMAX) 0.4 MG 24 hr capsule, Take 1 capsule (0.4 mg) by mouth daily, Disp: 90 capsule,  Rfl: 1     finasteride (PROPECIA) 1 MG tablet, Take 1 tablet (1 mg) by mouth daily, Disp: 90 tablet, Rfl: 3     gabapentin (NEURONTIN) 800 MG tablet, Take 1 tablet (800 mg) by mouth 3 times daily, Disp: 270 tablet, Rfl: 3     gabapentin (NEURONTIN) 400 MG capsule, Take 1 capsule (400 mg) by mouth At Bedtime, Disp: 90 capsule, Rfl: 3     omeprazole (PRILOSEC) 40 MG capsule, Take 1 capsule (40 mg) by mouth daily Take 30-60 minutes before a meal., Disp: 30 capsule, Rfl: 4     ORDER FOR DME, Respironics REMSTAR 60 Series Auto CPAP 5-15cm H20, Nuance pillows w/medium pillows, Disp: , Rfl:      ORDER FOR DME, Respironics Auto CPAP 5-15cm H2O, Disp: , Rfl:     Allergies:   Allergies   Allergen Reactions     Nkda [No Known Drug Allergies]        Social Hx: Works at the [employer]   reports that he quit smoking about 37 years ago. His smoking use included Cigarettes. He has never used smokeless tobacco. He reports that he does not drink alcohol or use illicit drugs.    Family Hx: family history includes Alzheimer Disease in his mother; DIABETES in his mother; HEART DISEASE in his brother; Respiratory in his father. There is no history of CANCER, Hypertension, CEREBROVASCULAR DISEASE, Thyroid Disease, Glaucoma, or Macular Degeneration.     This document serves as a record of the services and decisions personally performed and made by Anuel Cross MD. It was created on his behalf by Shannan Agarwal, a trained medical scribe. The creation of this document is based the provider's statements to the medical scribe.    Scribe Shannan Agarwal 9:51 AM 4/7/2017     REVIEW OF SYSTEMS: 10 point ROS neg other than the symptoms noted above in the HPI and PMH. Notables include  CONSTITUTIONAL:NEGATIVE for fever, chills, change in weight  INTEGUMENTARY/SKIN: NEGATIVE for worrisome rashes, moles or lesions  MUSCULOSKELETAL:See HPI above  NEURO: NEGATIVE for weakness, dizziness or paresthesias    PHYSICAL EXAM:  Resp 16   1.829 m (6')   Wt 90 kg (198 lb 6.4 oz)  BMI 26.91 kg/m2   GENERAL APPEARANCE: healthy, alert, no distress  SKIN: no suspicious lesions or rashes  NEURO: Normal strength and tone, mentation intact and speech normal  PSYCH:  mentation appears normal and affect normal, not anxious  RESPIRATORY: No increased work of breathing.  HANDS: no clubbing, nail pitting.    BILATERAL LOWER EXTREMITIES:  Gait: antalgic favoring right  Alignment: varus  No gross deformities or masses.  No Quad atrophy, strength normal.  Intact sensation deep peroneal nerve, superficial peroneal nerve, med/lat tibial nerve, sural nerve, saphenous nerve  Intact EHL, EDL, TA, FHL, GS, quadriceps hamstrings and hip flexors  Toes warm and well perfused, brisk capillary refill. Palpable 2+ dp pulses.  Bilateral calf soft and nttp or squeeze.  No palpable popliteal lymphadenopathy.  DTRs: achilles 2+, patella 2+.  Edema: none  Hips with full, pain-free motion. No irritability with flexion, adduction, and internal rotation.    LEFT KNEE EXAM:    Skin: intact, no ecchymosis or erythema  Squat: 100 %, not limited by pain.     ROM: 0 extension to 140 flexion  Tight hamstrings on straight leg raise.  Effusion: none  Tender: NTTP med/lat joint line, anterior or posterior knee  McMurrays: negative    MCL: stable, and non-painful at both 0 and 30 degrees knee flexion  Varus stress: stable, and non-painful at both 0 and 30 degrees knee flexion  Lachmans: neg, firm endpoint  Posterior Drawer stable  Patellofemoral joint:                Apprehension: negative              Crepitations: minimal    RIGHT KNEE EXAM:    Skin: intact, no ecchymosis or erythema  Squat: 100 %, not limited by pain.     ROM: 0 extension to 140 flexion with discomfort   Tight hamstrings on straight leg raise.  Effusion: trace  Tender: medial joint line, lateral joint line   NTTP anterior or posterior knee  McMurrays: negative    MCL: stable, and non-painful at both 0 and 30 degrees knee flexion  Varus  stress: stable, and non-painful at both 0 and 30 degrees knee flexion  Lachmans: neg, firm endpoint  Posterior Drawer stable  Patellofemoral joint:                Apprehension: negative              Crepitations: minimal    X-RAY:  3 views right knee from 4/4/2017 were reviewed in clinic today. On my review, no obvious fractures or dislocations. Medial and lateral chondrocalcinosis. Mild patello-femoral and mild-moderate medial space narrowing.    Impression:   61 year old male with right knee pain, chondrocalcinosis, mild osteoarthritis.    Plan:   * Reviewed imaging with patient. Also clinical exam findings. Mild osteoarthritis, chondrocalcinosis often seen in pseudogout.  * both pseudogout and arthritis can cause onset of pain, swelling.  * Explained that pseudogout can cause the knee to become inflamed.  * Discussed if symptoms continue or are worsened, I would recommend he have an MRI for further diagnostics.      Non-surgical treatment:    * rest, sitting  * Activity modification - avoid impact activities or activities that aggravate symptoms.  * NSAIDS (non-steroidal anti-inflammatory medications; e.g. Aleve, advil, motrin, ibuprofen) - regular use for inflammation ( twice daily or three times daily), with food, as long as no contra-indications Please discuss with primary care doctor if needed  * ice, 15-20 minutes at a time several times a day or as needed.  * Strengthening of quadriceps muscles  * home exercise program for strengthening, stretching and range of motion exercises of legs; consider Physical Therapy in future.  * Tylenol as needed for pain, consider Tylenol arthritis or similar  * Weight loss: Weight loss:  Body mass index is 26.91 kg/(m^2).. weight loss benefits, not only for the current pain symptoms, but also overall health. Recommend a good diet plan that works for the patient, with the assistance of a dietician or primary care doctor as needed. Also, a good, low-impact exercise program for  "at least 20 minutes per day, 3 times per week, such as exercise bike, elliptical , or pool.  * Exercise: low impact such as stationary bike, elliptical, pool.  * Injections: cortisone versus viscosupplementation (hyaluronic acid, \"rooster comb\", \"gel shots\"); risks and perceived benefits discussed today. Patient elects to proceed.  * Bracing: bracing the knee may offer some relief of symptoms when worn and provide some stability.  * over the counter supplements such as glucosamine and chondroitin sulfate may help with joint pain.  * topical ointments may help as well    * return to clinic as needed.    PROCEDURE NOTE:  The risks, perceived benefits and potential complications (including but not limited to: bleeding, infection, pain, scar, damage to adjacent structures, atrophy or necrosis of soft tissue, skin blanching, failure to relieve symptoms, worsening of symptoms, allergic reaction) of injection were discussed with the patient. Questions were addressed and answered.The patient elected to proceed. Written informed consent was obtained. The correct procedural site was identified and confirmed. A Right Knee intraarticular injection was performed using 1mL Depo Medrol 80mg per mL and 7mL (4mL 1% lidocaine, 3mL 0.25% marcaine)  of local anesthetic after sterile prep, to the correct procedural site. Sterile bandaid applied. This was tolerated well by the patient. No apparent complications. Did also discuss that if diabetic, recommend close monitoring of blood sugars over the next week as cortisone injections can temporarily elevate blood sugars.    The information in this document, created by a scribe for me, accurately reflects the services I personally performed and the decisions made by me. I have reviewed and approved this document for accuracy.      Anuel Cross M.D., M.S.  Dept. of Orthopaedic Surgery  [facility]      The information in this document, created by a scribe for me, " accurately reflects the services I personally performed and the decisions made by me. I have reviewed and approved this document for accuracy.      Anuel Cross M.D., M.S.  Dept. of Orthopaedic Surgery  [facility]          Again, thank you for allowing me to participate in the care of your patient.        Sincerely,              Anuel Cross MD

## 2017-04-07 NOTE — PATIENT INSTRUCTIONS
Please remember to call and schedule a follow up appointment if one was recommended at your earliest convenience.  Orthopedics CLINIC HOURS TELEPHONE NUMBER   Dr. Tay Calderon  Certified Medical Assistant   Monday & Wednesday   8am - 5pm  Thursday 1pm - 5pm  Friday 8am -11:30am Specialty schedulers:   [phone] to schedule your surgery.  Main Clinic:   [phone] to make an appointment with any provider.    Urgent Care locations:    [facility] Monday-Friday Closed  Saturday-Sunday 9am-5pm      Monday-Friday 12pm - 8pm  Saturday-Sunday 9am-5pm [phone]     If SURGERY has been recommended, please call our Specialty Schedulers at [phone] to schedule your procedure.    If you need a medication refill, please contact your pharmacy. Please allow 3 business days for your refill to be completed.    If an MRI or CT scan has been recommended, please call [facility] Schedulers at [phone] to schedule your appointment.  Use [employer] (secure e-mail communication and access to your chart) to send a message or to make an appointment. Please ask how you can sign up for [employer].  Your care team's suggested websites for health information:   Www.[email or web address] : Up to date and easily searchable information on multiple topics.   [email or web address].[email or web address] : [place] dept of heat, public health issues in [place], N1N1

## 2017-04-07 NOTE — MR AVS SNAPSHOT
After Visit Summary   4/7/2017    Navneet Toussaint    MRN: [id number]           Patient Information     Date Of Birth          1955        Visit Information        Provider Department      4/7/2017 8:45 AM Anuel Cross MD [facility] [place]        Today's Diagnoses     Primary osteoarthritis of right knee    -  1    Chondrocalcinosis of right knee        Acute pain of right knee          Care Instructions    Please remember to call and schedule a follow up appointment if one was recommended at your earliest convenience.  Orthopedics CLINIC HOURS TELEPHONE NUMBER   Dr. Tay Calderon  Certified Medical Assistant   Monday & Wednesday   8am - 5pm  Thursday 1pm - 5pm  Friday 8am -11:30am Specialty schedulers:   [phone] to schedule your surgery.  Main Clinic:   [phone] to make an appointment with any provider.    Urgent Care locations:    [facility] Monday-Friday Closed  Saturday-Sunday 9am-5pm      Monday-Friday 12pm - 8pm  Saturday-Sunday 9am-5pm [phone]     If SURGERY has been recommended, please call our Specialty Schedulers at [phone] to schedule your procedure.    If you need a medication refill, please contact your pharmacy. Please allow 3 business days for your refill to be completed.    If an MRI or CT scan has been recommended, please call [facility] Schedulers at [phone] to schedule your appointment.  Use [employer] (secure e-mail communication and access to your chart) to send a message or to make an appointment. Please ask how you can sign up for [employer].  Your care team's suggested websites for health information:   [email or web address] : Up to date and easily searchable information on multiple topics.   Www.[email or web address].us : [place] dept of heat, public health issues in [place], N1N1            Follow-ups after your visit        Follow-up notes from your care team     Return if symptoms worsen  or fail to improve.      Your next 10 appointments already scheduled     May 09, 2017   Procedure with Felx Jones MD   [facility] (--)    [address]  [place] [address] [phone]            May 16, 2017 10:00 AM CDT   (Arrive by 9:45 AM)   New Patient Visit with Kris Najera MD   [facility] ([facility])    [address]  3rd Floor  [place] [address] [phone]            May 23, 2017  9:00 AM CDT   Office Visit with Bello Bruno MD   [facility]    [address] [address] [phone]           Bring a current list of meds and any records pertaining to this visit.  For Physicals, please bring immunization records and any forms needing to be filled out.  Please arrive 10 minutes early to complete paperwork.              Who to contact     If you have questions or need follow up information about today's clinic visit or your schedule please contact [facility] directly at [phone].  Normal or non-critical lab and imaging results will be communicated to you by MyChart, letter or phone within 4 business days after the clinic has received the results. If you do not hear from us within 7 days, please contact the clinic through [employer] or phone. If you have a critical or abnormal lab result, we will notify you by phone as soon as possible.  Submit refill requests through [employer] or call your pharmacy and they will forward the refill request to us. Please allow 3 business days for your refill to be completed.          Additional Information About Your Visit        [employer] Information     [employer] gives you secure access to your electronic health record. If you see a primary care provider, you can also send messages to your care team and make appointments. If you have questions, please call your primary care clinic.  If you do not have a  primary care provider, please call [phone] and they will assist you.        Care EveryWhere ID     This is your Care EveryWhere ID. This could be used by other organizations to access your [place] medical records  [id number]        Your Vitals Were     Respirations Height BMI (Body Mass Index)             16 6' (1.829 m) 26.91 kg/m2          Blood Pressure from Last 3 Encounters:   04/04/17 106/60   03/14/17 115/67   11/30/16 119/68    Weight from Last 3 Encounters:   04/07/17 198 lb 6.4 oz (90 kg)   04/04/17 196 lb 9.6 oz (89.2 kg)   03/14/17 197 lb (89.4 kg)              We Performed the Following     DRAIN/INJECT LARGE JOINT/BURSA     METHYLPREDNISOLONE 80 MG INJ          Today's Medication Changes          These changes are accurate as of: 4/7/17 11:59 PM.  If you have any questions, ask your nurse or doctor.               Start taking these medicines.        Dose/Directions    methylPREDNISolone acetate 80 MG/ML injection   Commonly known as:  DEPO-MEDROL   Used for:  Primary osteoarthritis of right knee   Started by:  Anuel Cross MD        Dose:  80 mg   1 mL (80 mg) by INTRA-ARTICULAR route once for 1 dose   Quantity:  1 mL   Refills:  0            Where to get your medicines      Some of these will need a paper prescription and others can be bought over the counter.  Ask your nurse if you have questions.     You don't need a prescription for these medications     methylPREDNISolone acetate 80 MG/ML injection                Primary Care Provider Office Phone # Fax #    Bello Bruno [phone]       [facility] [address] [facility] [address]        Thank you!     Thank you for choosing [facility] [place]  for your care. Our goal is always to provide you with excellent care. Hearing back from our patients is one way we can continue to improve our services. Please take a few minutes to complete the written survey that you may receive in the mail  after your visit with us. Thank you!             Your Updated Medication List - Protect others around you: Learn how to safely use, store and throw away your medicines at www.disposemymeds.org.          This list is accurate as of: 4/7/17 11:59 PM.  Always use your most recent med list.                   Brand Name Dispense Instructions for use    finasteride 1 MG tablet    PROPECIA    90 tablet    Take 1 tablet (1 mg) by mouth daily       fluticasone 50 MCG/ACT spray    FLONASE    16 g    Spray 2 sprays into both nostrils daily       * gabapentin 800 MG tablet    NEURONTIN    270 tablet    Take 1 tablet (800 mg) by mouth 3 times daily       * gabapentin 400 MG capsule    NEURONTIN    90 capsule    Take 1 capsule (400 mg) by mouth At Bedtime       levothyroxine 112 MCG tablet    SYNTHROID/LEVOTHROID    90 tablet    Take 1 tablet (112 mcg) by mouth daily       methylPREDNISolone acetate 80 MG/ML injection    DEPO-MEDROL    1 mL    1 mL (80 mg) by INTRA-ARTICULAR route once for 1 dose       nabumetone 750 MG tablet    RELAFEN    20 tablet    Take 1 tablet (750 mg) by mouth 2 times daily as needed for moderate pain Take with food       omeprazole 40 MG capsule    priLOSEC    30 capsule    Take 1 capsule (40 mg) by mouth daily Take 30-60 minutes before a meal.       order for DME      Respironics Auto CPAP 5-15cm H2O       order for DME      Respironics REMSTAR 60 Series Auto CPAP 5-15cm H20, Nuance pillows w/medium pillows       tadalafil 20 MG tablet    CIALIS    10 tablet    Take 1 tablet (20 mg) by mouth daily as needed for erectile dysfunction       tamsulosin 0.4 MG capsule    FLOMAX    90 capsule    Take 1 capsule (0.4 mg) by mouth daily       TraMADol HCl 50 MG Tbdp     270 tablet    Take 1-2 tablets by mouth every 4 hours as needed Maximum is 8 tabs in a day       * Notice:  This list has 2 medication(s) that are the same as other medications prescribed for you. Read the directions carefully, and ask your  doctor or other care provider to review them with you.

## 2017-04-08 DIAGNOSIS — N40.1 BENIGN NON-NODULAR PROSTATIC HYPERPLASIA WITH LOWER URINARY TRACT SYMPTOMS: Primary | ICD-10-CM

## 2017-04-09 RX ORDER — METHYLPREDNISOLONE ACETATE 80 MG/ML
80 INJECTION, SUSPENSION INTRA-ARTICULAR; INTRALESIONAL; INTRAMUSCULAR; SOFT TISSUE ONCE
Qty: 1 ML | Refills: 0 | OUTPATIENT
Start: 2017-04-09 | End: 2017-04-09

## 2017-04-10 PROBLEM — N40.0 BPH (BENIGN PROSTATIC HYPERPLASIA): Status: RESOLVED | Noted: 2017-04-10 | Resolved: 2017-04-10

## 2017-04-10 PROBLEM — N40.1 BENIGN NON-NODULAR PROSTATIC HYPERPLASIA WITH LOWER URINARY TRACT SYMPTOMS: Status: ACTIVE | Noted: 2017-04-10

## 2017-04-10 PROBLEM — N40.0 BPH (BENIGN PROSTATIC HYPERPLASIA): Status: ACTIVE | Noted: 2017-04-10

## 2017-04-10 RX ORDER — TAMSULOSIN HYDROCHLORIDE 0.4 MG/1
CAPSULE ORAL
Qty: 90 CAPSULE | Refills: 3 | Status: SHIPPED | OUTPATIENT
Start: 2017-04-10 | End: 2018-05-06

## 2017-04-10 NOTE — TELEPHONE ENCOUNTER
Unable to identify when last addressed.  BPH not on problem list.   BP Readings from Last 6 Encounters:   04/04/17 106/60   03/14/17 115/67   11/30/16 119/68   05/09/16 129/70   11/11/15 114/61   10/20/15 124/70       Please advise on refill.  Sarahi Chen RN

## 2017-05-09 ENCOUNTER — SURGERY (OUTPATIENT)
Age: 62
End: 2017-05-09

## 2017-05-09 ENCOUNTER — HOSPITAL ENCOUNTER (OUTPATIENT)
Facility: AMBULATORY SURGERY CENTER | Age: 62
Discharge: HOME | End: 2017-05-09
Attending: SURGERY | Admitting: SURGERY
Payer: COMMERCIAL

## 2017-05-09 ENCOUNTER — PRE VISIT (OUTPATIENT)
Dept: NEUROLOGY | Facility: CLINIC | Age: 62
End: 2017-05-09

## 2017-05-09 VITALS
RESPIRATION RATE: 18 BRPM | DIASTOLIC BLOOD PRESSURE: 74 MMHG | OXYGEN SATURATION: 96 % | TEMPERATURE: 97.3 F | SYSTOLIC BLOOD PRESSURE: 122 MMHG

## 2017-05-09 PROBLEM — K64.8 INTERNAL HEMORRHOIDS: Status: ACTIVE | Noted: 2017-05-09

## 2017-05-09 PROBLEM — K57.30 DIVERTICULOSIS OF LARGE INTESTINE WITHOUT HEMORRHAGE: Status: ACTIVE | Noted: 2017-05-09

## 2017-05-09 LAB — COLONOSCOPY: NORMAL

## 2017-05-09 PROCEDURE — 45378 DIAGNOSTIC COLONOSCOPY: CPT

## 2017-05-09 PROCEDURE — G0121 COLON CA SCRN NOT HI RSK IND: HCPCS | Performed by: SURGERY

## 2017-05-09 PROCEDURE — G8907 PT DOC NO EVENTS ON DISCHARG: HCPCS

## 2017-05-09 PROCEDURE — G8918 PT W/O PREOP ORDER IV AB PRO: HCPCS

## 2017-05-09 RX ORDER — FENTANYL CITRATE 50 UG/ML
INJECTION, SOLUTION INTRAMUSCULAR; INTRAVENOUS PRN
Status: DISCONTINUED | OUTPATIENT
Start: 2017-05-09 | End: 2017-05-09 | Stop reason: HOSPADM

## 2017-05-09 RX ORDER — ONDANSETRON 2 MG/ML
4 INJECTION INTRAMUSCULAR; INTRAVENOUS
Status: DISCONTINUED | OUTPATIENT
Start: 2017-05-09 | End: 2017-05-10 | Stop reason: HOSPADM

## 2017-05-09 RX ORDER — LIDOCAINE 40 MG/G
CREAM TOPICAL
Status: DISCONTINUED | OUTPATIENT
Start: 2017-05-09 | End: 2017-05-10 | Stop reason: HOSPADM

## 2017-05-09 RX ADMIN — FENTANYL CITRATE 50 MCG: 50 INJECTION, SOLUTION INTRAMUSCULAR; INTRAVENOUS at 09:00

## 2017-05-09 RX ADMIN — FENTANYL CITRATE 100 MCG: 50 INJECTION, SOLUTION INTRAMUSCULAR; INTRAVENOUS at 08:57

## 2017-05-09 NOTE — TELEPHONE ENCOUNTER
1.  Date/reason for appt:5/16/17, Neuropathy  2.  Referring provider: SHWETA WARNER  3.  Call to patient (Yes / No - short description):No, referred   4.  Previous care at / records requested from:   [facility]- office notes are in epic.

## 2017-05-12 DIAGNOSIS — G60.9 IDIOPATHIC PERIPHERAL NEUROPATHY: ICD-10-CM

## 2017-05-12 RX ORDER — GABAPENTIN 800 MG/1
TABLET ORAL
Qty: 270 TABLET | Refills: 3 | Status: SHIPPED | OUTPATIENT
Start: 2017-05-12 | End: 2018-05-06

## 2017-05-12 RX ORDER — GABAPENTIN 400 MG/1
400 CAPSULE ORAL AT BEDTIME
Qty: 90 CAPSULE | Refills: 3 | Status: SHIPPED | OUTPATIENT
Start: 2017-05-12 | End: 2017-05-23

## 2017-05-16 ENCOUNTER — OFFICE VISIT (OUTPATIENT)
Dept: NEUROLOGY | Facility: CLINIC | Age: 62
End: 2017-05-16

## 2017-05-16 VITALS
DIASTOLIC BLOOD PRESSURE: 74 MMHG | WEIGHT: 193.5 LBS | BODY MASS INDEX: 26.21 KG/M2 | SYSTOLIC BLOOD PRESSURE: 134 MMHG | HEIGHT: 72 IN | HEART RATE: 74 BPM

## 2017-05-16 DIAGNOSIS — G62.89 OTHER POLYNEUROPATHY: ICD-10-CM

## 2017-05-16 DIAGNOSIS — G62.89 OTHER POLYNEUROPATHY: Primary | ICD-10-CM

## 2017-05-16 LAB
ERYTHROCYTE [SEDIMENTATION RATE] IN BLOOD BY WESTERGREN METHOD: 8 MM/H (ref 0–20)
HCV AB SERPL QL IA: NORMAL
VIT B12 SERPL-MCNC: 513 PG/ML (ref 193–986)

## 2017-05-16 RX ORDER — PREGABALIN 75 MG/1
75 CAPSULE ORAL 3 TIMES DAILY
Qty: 90 CAPSULE | Refills: 3 | Status: SHIPPED | OUTPATIENT
Start: 2017-05-16 | End: 2018-03-13

## 2017-05-16 ASSESSMENT — PAIN SCALES - GENERAL: PAINLEVEL: MODERATE PAIN (5)

## 2017-05-16 NOTE — NURSING NOTE
Chief Complaint   Patient presents with     Consult     [facility] - NEUROPATHY     Naomi Brooke MA

## 2017-05-16 NOTE — LETTER
2017       RE: Navneet Toussaint  [address] [address]  [place] [address]     Dear Colleague,    Thank you for referring your patient, Navneet Toussaint, to the [facility] NEUROLOGY at [facility]. Please see a copy of my visit note below.    May 16, 2017          Bello Bruno MD   [facility]    [address] [place] [address]      RE: Navneet Toussaint   MRN: [id number]   : 1955      Dear Dr. Bruno:      Mr. Toussaint is a very pleasant, 61-year-old  who comes in for evaluation of progressive numbness in his toes and the balls of his feet with associated burning, pain and some tingling.  He said this started around 7 years ago for no reason.  He does not really have any medical issues and has been relatively healthy.      He has not developed any weakness of his legs.  He has been using gabapentin 100 three times a day and tramadol, which have given him partial relief.  He says during the night he has a burning in his feet that is significant and the soles of his feet with some numbness and tingling developing.      He has had no bladder or bowel involvement.  He does use sildenafil for sex.       PAST MEDICAL HISTORY:  Diagnoses of lumbar radiculopathy, numbness in feet, idiopathic peripheral neuropathy, social anxiety disorder, internal hemorrhoids, diverticulosis of the large intestine, (?)___prostate hyperplasia with elevated PSA.  Impaired glucose tolerance; hemoglobin A1c, however, is 5.8., high triglyceride, erectile dysfunction, cataract, hypothyroidism, male pattern baldness.        PAST SURGICAL HISTORY:  Vasectomy, colonoscopy and prostate biopsy, which was negative.      CURRENT MEDICATIONS:  Omeprazole, levothyroxine, Flonase, Cialis, tramadol and gabapentin.      ALLERGIES:  None known.      FAMILY HISTORY:  Negative for neuropathy, and we did spend a significant period of time exploring that.      SOCIAL HISTORY:   The patient works as an  supervisor.  No alcohol or tobacco.  No exposure to toxic chemicals.  He lives in a home, which is 20 years old.       REVIEW OF SYSTEMS:  No history of dry mouth or dry eyes.  No history of stomach surgery, pernicious anemia, deer tick bite, etc.       PHYSICAL EXAMINATION:  He is quite a pleasant man.  He is tall and intelligent.  Blood pressure is 134/74.  His pulse is 74.  Weight is 193.  His mental status exam is quite normal.  Cranial nerves are all normal, including fundi.  Cerebellar testing is normal.  Motor exam is quite normal.  Normal muscle tone and muscle bulk.  No fasciculation.  His strength is good in all muscles.  He can walk up on heels and toes and tandem.  His sensory exam is positive, as I barely can feel the vibrating tuning foot in the toe, and the position sense is impaired.  Pinprick is impaired distally as well.  No foot discoloration or dysautonomia.  His Romberg is negative.  Reflexes are all present and symmetrical.  Plantars are flexor.  His gait is unremarkable.      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  Etiology does not appear evident.  No seeming risk factors.  He has had some glucose intolerance, but the hemoglobin A1c has been 5.8 recently.  No toxic exposure.      He has a history of prostatitis, but that has settled down, and I do not believe it is a malignancy, as he has had a negative biopsy.  The patient will have an EMG and do a series of laboratory studies to look at causes for the painful 1/5 neuropathy.  We will follow with him through My Chart, and we may ask him to be seen by the Neuropathy service.      Sincerely,      Kris Najera MD

## 2017-05-16 NOTE — MR AVS SNAPSHOT
After Visit Summary   5/16/2017    Navneet Toussaint    MRN: [id number]           Patient Information     Date Of Birth          1955        Visit Information        Provider Department      5/16/2017 10:00 AM Kris Najera MD [facility] Neurology        Today's Diagnoses     Other polyneuropathy (H)    -  1       Follow-ups after your visit        Your next 10 appointments already scheduled     Jun 06, 2017  8:00 AM CDT   (Arrive by 7:45 AM)   EMG with Greg Carvajal MD   [facility] EMG ([facility] [place])    [address] [place] [id number] [phone]           Do not use lotions or creams on the area to be tested. If you are on blood thinners (Warfarin, Coumadin, Lovenox, etc), please contact your primary care physician to check if it is safe to stop them 3 days prior to testing. If you have anxiety, please check with your referring physician to obtain anti-anxiety medication for the procedure.              Who to contact     Please call your clinic at [phone] to:    Ask questions about your health    Make or cancel appointments    Discuss your medicines    Learn about your test results    Speak to your doctor   If you have compliments or concerns about an experience at your clinic, or if you wish to file a complaint, please contact [facility] Patient Relations at [phone] or email us at [email or web address]         Additional Information About Your Visit        MyChart Information     [employer] gives you secure access to your electronic health record. If you see a primary care provider, you can also send messages to your care team and make appointments. If you have questions, please call your primary care clinic.  If you do not have a primary care provider, please call [phone] and they will assist you.      [employer] is an electronic gateway that provides easy, online access to your medical  records. With [employer], you can request a clinic appointment, read your test results, renew a prescription or communicate with your care team.     To access your existing account, please contact your [facility] or call [phone] for assistance.        Care EveryWhere ID     This is your Care EveryWhere ID. This could be used by other organizations to access your [place] medical records  [id number]        Your Vitals Were     Pulse Height BMI (Body Mass Index)             74 1.829 m (6') 26.24 kg/m2          Blood Pressure from Last 3 Encounters:   05/23/17 111/62   05/16/17 134/74   05/09/17 122/74    Weight from Last 3 Encounters:   05/23/17 85.7 kg (189 lb)   05/16/17 87.8 kg (193 lb 8 oz)   04/07/17 90 kg (198 lb 6.4 oz)              We Performed the Following     Lead Venous Blood Confirm     Lead          Today's Medication Changes          These changes are accurate as of: 5/16/17 11:59 PM.  If you have any questions, ask your nurse or doctor.               Start taking these medicines.        Dose/Directions    pregabalin 75 MG capsule   Commonly known as:  LYRICA   Used for:  Other polyneuropathy (H)   Started by:  Kris Najera MD        Dose:  75 mg   Take 1 capsule (75 mg) by mouth 3 times daily   Quantity:  90 capsule   Refills:  3            Where to get your medicines      Some of these will need a paper prescription and others can be bought over the counter.  Ask your nurse if you have questions.     Bring a paper prescription for each of these medications     pregabalin 75 MG capsule                Primary Care Provider Office Phone # Fax #    Bello Bruno [phone]       [facility] [address] [facility] [address]        Thank you!     Thank you for choosing [facility] NEUROLOGY  for your care. Our goal is always to provide you with excellent care. Hearing back from our patients is one way we can continue to improve our  services. Please take a few minutes to complete the written survey that you may receive in the mail after your visit with us. Thank you!             Your Updated Medication List - Protect others around you: Learn how to safely use, store and throw away your medicines at www.disposemymeds.org.          This list is accurate as of: 5/16/17 11:59 PM.  Always use your most recent med list.                   Brand Name Dispense Instructions for use    finasteride 1 MG tablet    PROPECIA    90 tablet    Take 1 tablet (1 mg) by mouth daily       fluticasone 50 MCG/ACT spray    FLONASE    16 g    Spray 2 sprays into both nostrils daily       gabapentin 800 MG tablet    NEURONTIN    270 tablet    TAKE 1 TABLET BY MOUTH 3 TIMES DAILY       levothyroxine 112 MCG tablet    SYNTHROID/LEVOTHROID    90 tablet    Take 1 tablet (112 mcg) by mouth daily       omeprazole 40 MG capsule    priLOSEC    30 capsule    Take 1 capsule (40 mg) by mouth daily Take 30-60 minutes before a meal.       order for DME      Respironics Auto CPAP 5-15cm H2O       order for DME      Respironics REMSTAR 60 Series Auto CPAP 5-15cm H20, Nuance pillows w/medium pillows       pregabalin 75 MG capsule    LYRICA    90 capsule    Take 1 capsule (75 mg) by mouth 3 times daily       tadalafil 20 MG tablet    CIALIS    10 tablet    Take 1 tablet (20 mg) by mouth daily as needed for erectile dysfunction       tamsulosin 0.4 MG capsule    FLOMAX    90 capsule    TAKE 1 CAPSULE (0.4 MG) BY MOUTH DAILY       * TraMADol HCl 50 MG Tbdp     270 tablet    Take 1-2 tablets by mouth every 4 hours as needed Maximum is 8 tabs in a day       * traMADol 50 MG tablet    ULTRAM    270 tablet    TAKE 1 TO 2 TABLETS BY MOUTH EVERY 4 HOURS AS NEEDED .MAXIMUM IS 8 TABS IN A DAY       * Notice:  This list has 2 medication(s) that are the same as other medications prescribed for you. Read the directions carefully, and ask your doctor or other care provider to review them with you.

## 2017-05-17 LAB
B BURGDOR IGG+IGM SER QL: 0.18 (ref 0–0.89)
ENA SS-A IGG SER IA-ACNC: NORMAL AI (ref 0–0.9)
ENA SS-B IGG SER IA-ACNC: NORMAL AI (ref 0–0.9)
FOLATE SERPL-MCNC: NORMAL NG/ML
IGA SERPL-MCNC: 162 MG/DL (ref 70–380)
IGG SERPL-MCNC: 937 MG/DL (ref 695–1620)
IGM SERPL-MCNC: 56 MG/DL (ref 60–265)
PROT PATTERN SERPL IFE-IMP: ABNORMAL

## 2017-05-18 LAB
LEAD BLD-MCNC: NORMAL UG/DL (ref 0–4.9)
SPECIMEN SOURCE: NORMAL

## 2017-05-18 NOTE — PROGRESS NOTES
May 16, 2017          Bello Bruno MD   [facility]    [address] [place] [address]      RE: Navneet Toussaint   MRN: [id number]   : 1955      Dear Dr. Bruno:      Mr. Toussaint is a very pleasant, 61-year-old  who comes in for evaluation of progressive numbness in his toes and the balls of his feet with associated burning, pain and some tingling.  He said this started around 7 years ago for no reason.  He does not really have any medical issues and has been relatively healthy.      He has not developed any weakness of his legs.  He has been using gabapentin 100 three times a day and tramadol, which have given him partial relief.  He says during the night he has a burning in his feet that is significant and the soles of his feet with some numbness and tingling developing.      He has had no bladder or bowel involvement.  He does use sildenafil for sex.       PAST MEDICAL HISTORY:  Diagnoses of lumbar radiculopathy, numbness in feet, idiopathic peripheral neuropathy, social anxiety disorder, internal hemorrhoids, diverticulosis of the large intestine, (?)___prostate hyperplasia with elevated PSA.  Impaired glucose tolerance; hemoglobin A1c, however, is 5.8., high triglyceride, erectile dysfunction, cataract, hypothyroidism, male pattern baldness.        PAST SURGICAL HISTORY:  Vasectomy, colonoscopy and prostate biopsy, which was negative.      CURRENT MEDICATIONS:  Omeprazole, levothyroxine, Flonase, Cialis, tramadol and gabapentin.      ALLERGIES:  None known.      FAMILY HISTORY:  Negative for neuropathy, and we did spend a significant period of time exploring that.      SOCIAL HISTORY:  The patient works as an  supervisor.  No alcohol or tobacco.  No exposure to toxic chemicals.  He lives in a home, which is 20 years old.       REVIEW OF SYSTEMS:  No history of dry mouth or dry eyes.  No history of stomach surgery, pernicious anemia, deer  tick bite, etc.       PHYSICAL EXAMINATION:  He is quite a pleasant man.  He is tall and intelligent.  Blood pressure is 134/74.  His pulse is 74.  Weight is 193.  His mental status exam is quite normal.  Cranial nerves are all normal, including fundi.  Cerebellar testing is normal.  Motor exam is quite normal.  Normal muscle tone and muscle bulk.  No fasciculation.  His strength is good in all muscles.  He can walk up on heels and toes and tandem.  His sensory exam is positive, as I barely can feel the vibrating tuning foot in the toe, and the position sense is impaired.  Pinprick is impaired distally as well.  No foot discoloration or dysautonomia.  His Romberg is negative.  Reflexes are all present and symmetrical.  Plantars are flexor.  His gait is unremarkable.      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  Etiology does not appear evident.  No seeming risk factors.  He has had some glucose intolerance, but the hemoglobin A1c has been 5.8 recently.  No toxic exposure.      He has a history of prostatitis, but that has settled down, and I do not believe it is a malignancy, as he has had a negative biopsy.  The patient will have an EMG and do a series of laboratory studies to look at causes for the painful 1/5 neuropathy.  We will follow with him through My Chart, and we may ask him to be seen by the Neuropathy service.      Sincerely,      MD LINDA Chahal MD             D: 2017 10:52   T: 2017 07:33   MT: pillo      Name:     MEHREEN ADORNO   MRN:      [id number]        Account:      [id number]   :      1955           Service Date: 2017      Document: [id number]

## 2017-05-20 LAB — LEAD BLDV-MCNC: NORMAL UG/DL
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
## 2017-05-23 ENCOUNTER — OFFICE VISIT (OUTPATIENT)
Dept: FAMILY MEDICINE | Facility: CLINIC | Age: 62
End: 2017-05-23
Payer: COMMERCIAL

## 2017-05-23 VITALS
BODY MASS INDEX: 25.6 KG/M2 | DIASTOLIC BLOOD PRESSURE: 62 MMHG | SYSTOLIC BLOOD PRESSURE: 111 MMHG | OXYGEN SATURATION: 97 % | WEIGHT: 189 LBS | HEART RATE: 64 BPM | HEIGHT: 72 IN | TEMPERATURE: 97.4 F

## 2017-05-23 DIAGNOSIS — G60.9 IDIOPATHIC PERIPHERAL NEUROPATHY: Primary | ICD-10-CM

## 2017-05-23 DIAGNOSIS — G89.4 CHRONIC PAIN SYNDROME: ICD-10-CM

## 2017-05-23 PROCEDURE — 99213 OFFICE O/P EST LOW 20 MIN: CPT | Performed by: FAMILY MEDICINE

## 2017-05-23 PROCEDURE — 99000 SPECIMEN HANDLING OFFICE-LAB: CPT | Performed by: FAMILY MEDICINE

## 2017-05-23 PROCEDURE — 80307 DRUG TEST PRSMV CHEM ANLYZR: CPT | Mod: 90 | Performed by: FAMILY MEDICINE

## 2017-05-23 NOTE — MR AVS SNAPSHOT
After Visit Summary   5/23/2017    Navneet Toussaint    MRN: [id number]           Patient Information     Date Of Birth          1955        Visit Information        Provider Department      5/23/2017 9:00 AM Bello Bruno MD [facility]        Today's Diagnoses     Idiopathic peripheral neuropathy    -  1    Chronic pain syndrome          Care Instructions    If you do not end up taking the lyrica send me a [employer] message if you would like to go up to the maximum dose of the gabapentin         Follow-ups after your visit        Follow-up notes from your care team     Return in about 6 months (around 11/23/2017) for chronic pain recheck.      Your next 10 appointments already scheduled     Jun 06, 2017  8:00 AM CDT   (Arrive by 7:45 AM)   EMG with Greg Carvajal MD   [facility] EMG ([facility])    [address] [place] [id number] [phone]           Do not use lotions or creams on the area to be tested. If you are on blood thinners (Warfarin, Coumadin, Lovenox, etc), please contact your primary care physician to check if it is safe to stop them 3 days prior to testing. If you have anxiety, please check with your referring physician to obtain anti-anxiety medication for the procedure.              Who to contact     If you have questions or need follow up information about today's clinic visit or your schedule please contact [facility] directly at [phone].  Normal or non-critical lab and imaging results will be communicated to you by MyChart, letter or phone within 4 business days after the clinic has received the results. If you do not hear from us within 7 days, please contact the clinic through MyChart or phone. If you have a critical or abnormal lab result, we will notify you by phone as soon as possible.  Submit refill requests through [employer] or call your pharmacy and they will forward the  refill request to us. Please allow 3 business days for your refill to be completed.          Additional Information About Your Visit        [employer] Information     Extole gives you secure access to your electronic health record. If you see a primary care provider, you can also send messages to your care team and make appointments. If you have questions, please call your primary care clinic.  If you do not have a primary care provider, please call [phone] and they will assist you.        Care EveryWhere ID     This is your Care EveryWhere ID. This could be used by other organizations to access your [place] medical records  [id number]        Your Vitals Were     Pulse Temperature Height Pulse Oximetry BMI (Body Mass Index)       64 97.4  F (36.3  C) (Oral) 6' (1.829 m) 97% 25.63 kg/m2        Blood Pressure from Last 3 Encounters:   05/23/17 111/62   05/16/17 134/74   05/09/17 122/74    Weight from Last 3 Encounters:   05/23/17 189 lb (85.7 kg)   05/16/17 193 lb 8 oz (87.8 kg)   04/07/17 198 lb 6.4 oz (90 kg)              We Performed the Following     Pain Drug Scr UR W Rptd Meds        Primary Care Provider Office Phone # Fax #    Bello Bruno [phone]       [facility] [address] [facility] [address]        Thank you!     Thank you for choosing [facility]  for your care. Our goal is always to provide you with excellent care. Hearing back from our patients is one way we can continue to improve our services. Please take a few minutes to complete the written survey that you may receive in the mail after your visit with us. Thank you!             Your Updated Medication List - Protect others around you: Learn how to safely use, store and throw away your medicines at www.disposemymeds.org.          This list is accurate as of: 5/23/17  9:30 AM.  Always use your most recent med list.                   Brand Name Dispense Instructions for use     finasteride 1 MG tablet    PROPECIA    90 tablet    Take 1 tablet (1 mg) by mouth daily       fluticasone 50 MCG/ACT spray    FLONASE    16 g    Spray 2 sprays into both nostrils daily       gabapentin 800 MG tablet    NEURONTIN    270 tablet    TAKE 1 TABLET BY MOUTH 3 TIMES DAILY       levothyroxine 112 MCG tablet    SYNTHROID/LEVOTHROID    90 tablet    Take 1 tablet (112 mcg) by mouth daily       omeprazole 40 MG capsule    priLOSEC    30 capsule    Take 1 capsule (40 mg) by mouth daily Take 30-60 minutes before a meal.       order for DME      Respironics Auto CPAP 5-15cm H2O       order for DME      Respironics REMSTAR 60 Series Auto CPAP 5-15cm H20, Nuance pillows w/medium pillows       pregabalin 75 MG capsule    LYRICA    90 capsule    Take 1 capsule (75 mg) by mouth 3 times daily       tadalafil 20 MG tablet    CIALIS    10 tablet    Take 1 tablet (20 mg) by mouth daily as needed for erectile dysfunction       tamsulosin 0.4 MG capsule    FLOMAX    90 capsule    TAKE 1 CAPSULE (0.4 MG) BY MOUTH DAILY       * TraMADol HCl 50 MG Tbdp     270 tablet    Take 1-2 tablets by mouth every 4 hours as needed Maximum is 8 tabs in a day       * traMADol 50 MG tablet    ULTRAM    270 tablet    TAKE 1 TO 2 TABLETS BY MOUTH EVERY 4 HOURS AS NEEDED .MAXIMUM IS 8 TABS IN A DAY       * Notice:  This list has 2 medication(s) that are the same as other medications prescribed for you. Read the directions carefully, and ask your doctor or other care provider to review them with you.

## 2017-05-23 NOTE — PATIENT INSTRUCTIONS
If you do not end up taking the lyrica send me a [employer] message if you would like to go up to the maximum dose of the gabapentin

## 2017-05-28 LAB — PAIN DRUG SCR UR W RPTD MEDS: NORMAL

## 2017-06-04 DIAGNOSIS — E03.9 HYPOTHYROIDISM, UNSPECIFIED TYPE: ICD-10-CM

## 2017-06-05 RX ORDER — LEVOTHYROXINE SODIUM 112 UG/1
TABLET ORAL
Qty: 90 TABLET | Refills: 0 | Status: SHIPPED | OUTPATIENT
Start: 2017-06-05 | End: 2017-09-18

## 2017-06-05 NOTE — TELEPHONE ENCOUNTER
Rx refilled per Welch RN refill protocol.    TC, patient due for:  Thyroid labs next month    Marisela Soriano RN

## 2017-06-06 ENCOUNTER — OFFICE VISIT (OUTPATIENT)
Dept: NEUROLOGY | Facility: CLINIC | Age: 62
End: 2017-06-06

## 2017-06-06 DIAGNOSIS — G62.89 OTHER POLYNEUROPATHY: ICD-10-CM

## 2017-06-06 DIAGNOSIS — G62.89 AXONAL SENSORIMOTOR NEUROPATHY: Primary | ICD-10-CM

## 2017-06-06 NOTE — PROGRESS NOTES
[facility]  Electrodiagnostic Laboratory    Nerve Conduction & EMG Report          Patient: Navneet Toussaint  YOB: 1955  Patient ID: [id number]  Age: 61 Years 6 Months  Gender: Male      Referring Physician: Kris Najera MD    History & Examination:    61 year old man with numbness,tingling and burning pain at both feet for about 8 years. Query polyneuropathy. A previous study done in 2010 at [facility] was available for comparison.    Techniques: Motor and sensory conduction studies were done with surface recording electrodes. Temperature was monitored and recorded throughout the study. Upper extremities were maintained at a temperature of 32 degrees Centigrade or higher.  Lower extremities were maintained at a temperature of 31degrees Centigrade or higher.     Results:    Left radial and bilateral sural antidromic sensory NCSs were normal in absolute values. Left median, deep peroneal and tibial motor NCSs and left tibial and median F-wave latencies were normal. EMG was deferred.  Comparison to the previous study indicates that the sural SNAP amplitudes are now considerably lower (5-6 uV as opposed to 15 uV in 2010), and so is the tibial CMAP amplitude (now 5.6 mV, previously 12.4 mV).    Interpretation:    This study is abnormal if the results of the previous electrodiagnostic study from 2010 are taken into account. The decline of the sural SNAP and tibial CMAP amplitudes by >60% compared to the previous study, indicates the presence  of an axonal, sensorimotor, length dependent polyneuropathy. The etiology cannot be determined from this study.     EMG Physician:    Greg Carvajal MD        Sensory NCS      Nerve / Sites Rec. Site Onset Peak NP Amp Ref. PP Amp Dist Iglesia Ref. Temp     ms ms  V  V  V cm m/s m/s  C   L RADIAL - Snuff      Forearm Snuff 1.88 2.40 15.6 15.0 24.9 10 53.3 48.0 32.5   L SURAL - Lat Mall 60      Calf Ankle 3.23 4.32 5.4 5.0 6.7 14.5  44.9 38.0 31.4   R SURAL - Lat Mall 60      Calf Ankle 3.02 4.01 6.0 5.0 8.0 14 46.3 38.0 31.4       Motor NCS      Nerve / Sites Rec. Site Lat Ref. Amp Ref. Rel Amp Dist Iglesia Ref. Dur. Area Temp.     ms ms mV mV % cm m/s m/s ms %  C   L MEDIAN - APB      Wrist APB 3.65 4.40 9.9 5.0 100 8   7.34 100 32.5      Elbow APB 8.80  9.8  99 27.5 53.3 48.0 7.55 96.5 32.5   L DEEP PERONEAL - EDB 60      Ankle EDB 3.70 6.00 4.2 2.0 100 8   6.61 100 31.3      FibHead EDB 10.83  3.8  89.8 33 46.2 38.0 7.34 94.8 31.3      Pop Fos EDB 13.02  3.7  86.2 9.5 43.4 38.0 7.97 97.8 31.3   L TIBIAL - AH      Ankle AH 4.27 6.00 5.6 4.0 100 8   5.73 100 31.8      Pop Fos AH 12.76  3.5  62.3 42 49.5 38.0 9.17 81.8 31.8       F  Wave      Nerve Min F Lat Max F Lat Mean FLat Temp.    ms ms ms  C   L TIBIAL 57.03 58.91 57.84 31.8   L MEDIAN 30.31 34.11 32.19 32

## 2017-06-06 NOTE — LETTER
6/6/2017       RE: Navneet Toussaint  [address] [address]  [place] [address]     Dear Colleague,    Thank you for referring your patient, Navneet Toussaint, to the [facility] EMG at [facility]. Please see a copy of my visit note below.        [facility]  Electrodiagnostic Laboratory    Nerve Conduction & EMG Report          Patient: Navneet Toussaint  YOB: 1955  Patient ID: [id number]  Age: 61 Years 6 Months  Gender: Male      Referring Physician: Kris Najera MD    History & Examination:    61 year old man with numbness,tingling and burning pain at both feet for about 8 years. Query polyneuropathy. A previous study done in 2010 at [facility] was available for comparison.    Techniques: Motor and sensory conduction studies were done with surface recording electrodes. Temperature was monitored and recorded throughout the study. Upper extremities were maintained at a temperature of 32 degrees Centigrade or higher.  Lower extremities were maintained at a temperature of 31degrees Centigrade or higher.     Results:    Left radial and bilateral sural antidromic sensory NCSs were normal in absolute values. Left median, deep peroneal and tibial motor NCSs and left tibial and median F-wave latencies were normal. EMG was deferred.  Comparison to the previous study indicates that the sural SNAP amplitudes are now considerably lower (5-6 uV as opposed to 15 uV in 2010), and so is the tibial CMAP amplitude (now 5.6 mV, previously 12.4 mV).    Interpretation:    This study is abnormal if the results of the previous electrodiagnostic study from 2010 are taken into account. The decline of the sural SNAP and tibial CMAP amplitudes by >60% compared to the previous study, indicates the presence  of an axonal, sensorimotor, length dependent polyneuropathy. The etiology cannot be determined from this study.     EMG Physician:    Greg Carvajal MD         Sensory NCS      Nerve / Sites Rec. Site Onset Peak NP Amp Ref. PP Amp Dist Iglesia Ref. Temp     ms ms  V  V  V cm m/s m/s  C   L RADIAL - Snuff      Forearm Snuff 1.88 2.40 15.6 15.0 24.9 10 53.3 48.0 32.5   L SURAL - Lat Mall 60      Calf Ankle 3.23 4.32 5.4 5.0 6.7 14.5 44.9 38.0 31.4   R SURAL - Lat Mall 60      Calf Ankle 3.02 4.01 6.0 5.0 8.0 14 46.3 38.0 31.4       Motor NCS      Nerve / Sites Rec. Site Lat Ref. Amp Ref. Rel Amp Dist Iglesia Ref. Dur. Area Temp.     ms ms mV mV % cm m/s m/s ms %  C   L MEDIAN - APB      Wrist APB 3.65 4.40 9.9 5.0 100 8   7.34 100 32.5      Elbow APB 8.80  9.8  99 27.5 53.3 48.0 7.55 96.5 32.5   L DEEP PERONEAL - EDB 60      Ankle EDB 3.70 6.00 4.2 2.0 100 8   6.61 100 31.3      FibHead EDB 10.83  3.8  89.8 33 46.2 38.0 7.34 94.8 31.3      Pop Fos EDB 13.02  3.7  86.2 9.5 43.4 38.0 7.97 97.8 31.3   L TIBIAL - AH      Ankle AH 4.27 6.00 5.6 4.0 100 8   5.73 100 31.8      Pop Fos AH 12.76  3.5  62.3 42 49.5 38.0 9.17 81.8 31.8       F  Wave      Nerve Min F Lat Max F Lat Mean FLat Temp.    ms ms ms  C   L TIBIAL 57.03 58.91 57.84 31.8   L MEDIAN 30.31 34.11 32.19 32                            Again, thank you for allowing me to participate in the care of your patient.      Sincerely,    Greg Carvajal MD

## 2017-06-06 NOTE — MR AVS SNAPSHOT
After Visit Summary   6/6/2017    Navneet Toussaint    MRN: [id number]           Patient Information     Date Of Birth          1955        Visit Information        Provider Department      6/6/2017 8:00 AM Greg Carvajal MD  Health EMG        Today's Diagnoses     Axonal sensorimotor neuropathy (H)    -  1    Other polyneuropathy (H)           Follow-ups after your visit        Who to contact     Please call your clinic at [phone] to:    Ask questions about your health    Make or cancel appointments    Discuss your medicines    Learn about your test results    Speak to your doctor   If you have compliments or concerns about an experience at your clinic, or if you wish to file a complaint, please contact [facility] Patient Relations at [phone] or email us at [email or web address]         Additional Information About Your Visit        MyChart Information     [employer] gives you secure access to your electronic health record. If you see a primary care provider, you can also send messages to your care team and make appointments. If you have questions, please call your primary care clinic.  If you do not have a primary care provider, please call [phone] and they will assist you.      DeepFlex is an electronic gateway that provides easy, online access to your medical records. With DeepFlex, you can request a clinic appointment, read your test results, renew a prescription or communicate with your care team.     To access your existing account, please contact your [facility] or call [phone] for assistance.        Care EveryWhere ID     This is your Care EveryWhere ID. This could be used by other organizations to access your [place] medical records  [id number]         Blood Pressure from Last 3 Encounters:   05/23/17 111/62   05/16/17 134/74   05/09/17 122/74    Weight from Last 3 Encounters:   05/23/17  85.7 kg (189 lb)   05/16/17 87.8 kg (193 lb 8 oz)   04/07/17 90 kg (198 lb 6.4 oz)              We Performed the Following     EMG     HC NCS MOTOR W OR W/O F-WAVE, 5 OR 6        Primary Care Provider Office Phone # Fax #    Bello Bruno [phone]       [facility] [address] [facility] [address]        Thank you!     Thank you for choosing [facility]  for your care. Our goal is always to provide you with excellent care. Hearing back from our patients is one way we can continue to improve our services. Please take a few minutes to complete the written survey that you may receive in the mail after your visit with us. Thank you!             Your Updated Medication List - Protect others around you: Learn how to safely use, store and throw away your medicines at www.disposemymeds.org.          This list is accurate as of: 6/6/17  8:37 AM.  Always use your most recent med list.                   Brand Name Dispense Instructions for use    finasteride 1 MG tablet    PROPECIA    90 tablet    Take 1 tablet (1 mg) by mouth daily       fluticasone 50 MCG/ACT spray    FLONASE    16 g    Spray 2 sprays into both nostrils daily       gabapentin 800 MG tablet    NEURONTIN    270 tablet    TAKE 1 TABLET BY MOUTH 3 TIMES DAILY       levothyroxine 112 MCG tablet    SYNTHROID/LEVOTHROID    90 tablet    TAKE 1 TABLET (112 MCG) BY MOUTH DAILY       omeprazole 40 MG capsule    priLOSEC    30 capsule    Take 1 capsule (40 mg) by mouth daily Take 30-60 minutes before a meal.       order for DME      Respironics Auto CPAP 5-15cm H2O       order for DME      Respironics REMSTAR 60 Series Auto CPAP 5-15cm H20, Nuance pillows w/medium pillows       pregabalin 75 MG capsule    LYRICA    90 capsule    Take 1 capsule (75 mg) by mouth 3 times daily       tadalafil 20 MG tablet    CIALIS    10 tablet    Take 1 tablet (20 mg) by mouth daily as needed for erectile dysfunction       tamsulosin 0.4 MG  capsule    FLOMAX    90 capsule    TAKE 1 CAPSULE (0.4 MG) BY MOUTH DAILY       * TraMADol HCl 50 MG Tbdp     270 tablet    Take 1-2 tablets by mouth every 4 hours as needed Maximum is 8 tabs in a day       * traMADol 50 MG tablet    ULTRAM    270 tablet    TAKE 1 TO 2 TABLETS BY MOUTH EVERY 4 HOURS AS NEEDED .MAXIMUM IS 8 TABS IN A DAY       * Notice:  This list has 2 medication(s) that are the same as other medications prescribed for you. Read the directions carefully, and ask your doctor or other care provider to review them with you.

## 2017-06-20 ENCOUNTER — OFFICE VISIT (OUTPATIENT)
Dept: URGENT CARE | Facility: URGENT CARE | Age: 62
End: 2017-06-20
Payer: COMMERCIAL

## 2017-06-20 ENCOUNTER — MYC MEDICAL ADVICE (OUTPATIENT)
Dept: FAMILY MEDICINE | Facility: CLINIC | Age: 62
End: 2017-06-20

## 2017-06-20 VITALS
BODY MASS INDEX: 26.31 KG/M2 | WEIGHT: 194 LBS | DIASTOLIC BLOOD PRESSURE: 64 MMHG | OXYGEN SATURATION: 96 % | HEART RATE: 63 BPM | TEMPERATURE: 98.5 F | SYSTOLIC BLOOD PRESSURE: 115 MMHG

## 2017-06-20 DIAGNOSIS — Z20.818 EXPOSURE TO PERTUSSIS: ICD-10-CM

## 2017-06-20 DIAGNOSIS — R05.9 COUGH: Primary | ICD-10-CM

## 2017-06-20 PROCEDURE — 99213 OFFICE O/P EST LOW 20 MIN: CPT | Performed by: PHYSICIAN ASSISTANT

## 2017-06-20 PROCEDURE — 87801 DETECT AGNT MULT DNA AMPLI: CPT | Performed by: PHYSICIAN ASSISTANT

## 2017-06-20 RX ORDER — AZITHROMYCIN 250 MG/1
TABLET, FILM COATED ORAL
Qty: 6 TABLET | Refills: 0 | Status: SHIPPED | OUTPATIENT
Start: 2017-06-20 | End: 2018-03-13

## 2017-06-20 NOTE — NURSING NOTE
Chief Complaint   Patient presents with     Cough     X 3 days       Initial /64  Pulse 63  Temp 98.5  F (36.9  C) (Oral)  Wt 194 lb (88 kg)  SpO2 96%  BMI 26.31 kg/m2 Estimated body mass index is 26.31 kg/(m^2) as calculated from the following:    Height as of 5/23/17: 6' (1.829 m).    Weight as of this encounter: 194 lb (88 kg).  Medication Reconciliation: complete   Lanette Cruz CMA

## 2017-06-20 NOTE — TELEPHONE ENCOUNTER
I have discussed this patient's issue with the RN involved and we have come up with this plan.  Bello Bruno MD

## 2017-06-20 NOTE — MR AVS SNAPSHOT
After Visit Summary   6/20/2017    Navneet Toussaint    MRN: [id number]           Patient Information     Date Of Birth          1955        Visit Information        Provider Department      6/20/2017 6:45 PM Jessica Gaitan PA-C [facility]        Today's Diagnoses     Cough    -  1       Follow-ups after your visit        Who to contact     If you have questions or need follow up information about today's clinic visit or your schedule please contact [facility] directly at [phone].  Normal or non-critical lab and imaging results will be communicated to you by MyChart, letter or phone within 4 business days after the clinic has received the results. If you do not hear from us within 7 days, please contact the clinic through [employer] or phone. If you have a critical or abnormal lab result, we will notify you by phone as soon as possible.  Submit refill requests through [employer] or call your pharmacy and they will forward the refill request to us. Please allow 3 business days for your refill to be completed.          Additional Information About Your Visit        MyChart Information     Jumpido gives you secure access to your electronic health record. If you see a primary care provider, you can also send messages to your care team and make appointments. If you have questions, please call your primary care clinic.  If you do not have a primary care provider, please call [phone] and they will assist you.        Care EveryWhere ID     This is your Care EveryWhere ID. This could be used by other organizations to access your [place] medical records  [id number]        Your Vitals Were     Pulse Temperature Pulse Oximetry BMI (Body Mass Index)          63 98.5  F (36.9  C) (Oral) 96% 26.31 kg/m2         Blood Pressure from Last 3 Encounters:   06/20/17 115/64   05/23/17 111/62   05/16/17 134/74    Weight from Last 3 Encounters:   06/20/17 194 lb (88 kg)   05/23/17 189  lb (85.7 kg)   05/16/17 193 lb 8 oz (87.8 kg)              We Performed the Following     Bordetella pert parapert DNA pcr          Today's Medication Changes          These changes are accurate as of: 6/20/17  7:16 PM.  If you have any questions, ask your nurse or doctor.               Start taking these medicines.        Dose/Directions    azithromycin 250 MG tablet   Commonly known as:  ZITHROMAX Z-ESTHELA   Used for:  Cough   Started by:  Jesscia Gaitan PA-C        2 tabs day one then 1 tab qd   Quantity:  6 tablet   Refills:  0            Where to get your medicines      These medications were sent to [facility]/pharmacy #[id number] - [place], [place] - [address] [facility]  [address], [place] [address]     Phone:  [phone]     azithromycin 250 MG tablet                Primary Care Provider Office Phone # Fax #    Bello Bruno [phone]       [facility] [address] [place] [address]        Thank you!     Thank you for choosing [facility]  for your care. Our goal is always to provide you with excellent care. Hearing back from our patients is one way we can continue to improve our services. Please take a few minutes to complete the written survey that you may receive in the mail after your visit with us. Thank you!             Your Updated Medication List - Protect others around you: Learn how to safely use, store and throw away your medicines at www.disposemymeds.org.          This list is accurate as of: 6/20/17  7:16 PM.  Always use your most recent med list.                   Brand Name Dispense Instructions for use    azithromycin 250 MG tablet    ZITHROMAX Z-ESTHELA    6 tablet    2 tabs day one then 1 tab qd       finasteride 1 MG tablet    PROPECIA    90 tablet    Take 1 tablet (1 mg) by mouth daily       fluticasone 50 MCG/ACT spray    FLONASE    16 g    Spray 2 sprays into both nostrils daily       gabapentin 800  MG tablet    NEURONTIN    270 tablet    TAKE 1 TABLET BY MOUTH 3 TIMES DAILY       levothyroxine 112 MCG tablet    SYNTHROID/LEVOTHROID    90 tablet    TAKE 1 TABLET (112 MCG) BY MOUTH DAILY       omeprazole 40 MG capsule    priLOSEC    30 capsule    Take 1 capsule (40 mg) by mouth daily Take 30-60 minutes before a meal.       order for DME      Respironics Auto CPAP 5-15cm H2O       order for DME      Respironics REMSTAR 60 Series Auto CPAP 5-15cm H20, Nuance pillows w/medium pillows       pregabalin 75 MG capsule    LYRICA    90 capsule    Take 1 capsule (75 mg) by mouth 3 times daily       tadalafil 20 MG tablet    CIALIS    10 tablet    Take 1 tablet (20 mg) by mouth daily as needed for erectile dysfunction       tamsulosin 0.4 MG capsule    FLOMAX    90 capsule    TAKE 1 CAPSULE (0.4 MG) BY MOUTH DAILY       * TraMADol HCl 50 MG Tbdp     270 tablet    Take 1-2 tablets by mouth every 4 hours as needed Maximum is 8 tabs in a day       * traMADol 50 MG tablet    ULTRAM    270 tablet    TAKE 1 TO 2 TABLETS BY MOUTH EVERY 4 HOURS AS NEEDED .MAXIMUM IS 8 TABS IN A DAY       * Notice:  This list has 2 medication(s) that are the same as other medications prescribed for you. Read the directions carefully, and ask your doctor or other care provider to review them with you.

## 2017-06-20 NOTE — TELEPHONE ENCOUNTER
Per protocol, will route encounter to be cosigned by provider for Verbal Orders.  Sarahi Chen RN

## 2017-06-20 NOTE — PROGRESS NOTES
SUBJECTIVE:                                                    Navneet Toussaint is a 61 year old male who presents to clinic today for the following health issues:      RESPIRATORY SYMPTOMS      Duration: X 3 days    Description  nasal congestion, cough and fatigue/malaise    Severity: moderate    Accompanying signs and symptoms: None    History (predisposing factors):  Exposure to whooping cough    Precipitating or alleviating factors: None    Therapies tried and outcome:  none         Wife does  at their house. One of the kids had pertussis. He never was physically in same room as them. Wife placed on abx        Allergies   Allergen Reactions     Nkda [No Known Drug Allergies]        Past Medical History:   Diagnosis Date     Cataract, mod, od; mild-mod, os 2/24/2015     Hypothyroid      Sleep apnea     doesnt use CPAP machine      Social anxiety disorder          Current Outpatient Prescriptions on File Prior to Visit:  levothyroxine (SYNTHROID/LEVOTHROID) 112 MCG tablet TAKE 1 TABLET (112 MCG) BY MOUTH DAILY   pregabalin (LYRICA) 75 MG capsule Take 1 capsule (75 mg) by mouth 3 times daily   gabapentin (NEURONTIN) 800 MG tablet TAKE 1 TABLET BY MOUTH 3 TIMES DAILY   traMADol (ULTRAM) 50 MG tablet TAKE 1 TO 2 TABLETS BY MOUTH EVERY 4 HOURS AS NEEDED .MAXIMUM IS 8 TABS IN A DAY   tamsulosin (FLOMAX) 0.4 MG capsule TAKE 1 CAPSULE (0.4 MG) BY MOUTH DAILY   TraMADol HCl 50 MG TBDP Take 1-2 tablets by mouth every 4 hours as needed Maximum is 8 tabs in a day   tadalafil (CIALIS) 20 MG tablet Take 1 tablet (20 mg) by mouth daily as needed for erectile dysfunction   fluticasone (FLONASE) 50 MCG/ACT nasal spray Spray 2 sprays into both nostrils daily   finasteride (PROPECIA) 1 MG tablet Take 1 tablet (1 mg) by mouth daily   omeprazole (PRILOSEC) 40 MG capsule Take 1 capsule (40 mg) by mouth daily Take 30-60 minutes before a meal.   ORDER FOR [facility] Respironics REMSTAR 60 Series Auto CPAP 5-15cm H20, Nuance pillows  w/medium pillows   ORDER FOR DME Respironics Auto CPAP 5-15cm H2O     No current facility-administered medications on file prior to visit.     Social History   Substance Use Topics     Smoking status: Former Smoker     Types: Cigarettes     Quit date: 10/1/1979     Smokeless tobacco: Never Used      Comment: Lives in smokefree house     Alcohol use No      Comment: weekly       ROS:  Consitutional: As above  ENT: As above  Respiratory: As above    OBJECTIVE:  /64  Pulse 63  Temp 98.5  F (36.9  C) (Oral)  Wt 194 lb (88 kg)  SpO2 96%  BMI 26.31 kg/m2  GENERAL APPEARANCE: healthy, alert and no distress  EYES: conjunctiva clear  EARS: No cerumen.   Ear canals w/o erythema, TM's intact w/o erythema.    NOSE/MOUTH: Nose and mouth without ulcers, erythema or lesions  SINUSES: No maxillary sinus tenderness.  THROAT: Mild erythema w/o tonsillar enlargement . No exudates  NECK: supple, nontender, no lymphadenopathy  RESP: lungs clear to auscultation - no rales, rhonchi or wheezes  CV: regular rates and rhythm, normal S1 S2, no murmur noted  NEURO: awake, alert            ASSESSMENT: Well appearing.    ICD-10-CM    1. Cough R05 Bordetella pert parapert DNA pcr     azithromycin (ZITHROMAX Z-ESTHELA) 250 MG tablet   2. Exposure to pertussis Z20.818            PLAN: Will call with results  Lots of rest and fluids.  RTC if any worsening symptoms or if not improving.    Jessica Gaitan PA-C

## 2017-06-22 LAB
B PERT+PARAPERT DNA PNL SPEC NAA+PROBE: NORMAL
SPECIMEN SOURCE: NORMAL

## 2017-07-12 ENCOUNTER — MYC MEDICAL ADVICE (OUTPATIENT)
Dept: FAMILY MEDICINE | Facility: CLINIC | Age: 62
End: 2017-07-12

## 2017-07-15 ENCOUNTER — DOCUMENTATION ONLY (OUTPATIENT)
Dept: LAB | Facility: CLINIC | Age: 62
End: 2017-07-15

## 2017-07-15 DIAGNOSIS — E03.9 HYPOTHYROIDISM, UNSPECIFIED TYPE: Primary | ICD-10-CM

## 2017-07-15 NOTE — PROGRESS NOTES
..Please place or confirm orders for upcoming lab appointment on 07/20/17    Thanks  Dorothy Barton

## 2017-07-17 NOTE — PROGRESS NOTES
Need previsit labs-see orders and close encounter if nothing else needed./Penny Gonzales,       Thyroid 7/21/17

## 2017-07-21 DIAGNOSIS — E03.9 HYPOTHYROIDISM, UNSPECIFIED TYPE: ICD-10-CM

## 2017-07-21 LAB — TSH SERPL DL<=0.005 MIU/L-ACNC: 1.51 MU/L (ref 0.4–4)

## 2017-07-21 PROCEDURE — 84443 ASSAY THYROID STIM HORMONE: CPT | Performed by: PHYSICIAN ASSISTANT

## 2017-07-21 PROCEDURE — 36415 COLL VENOUS BLD VENIPUNCTURE: CPT | Performed by: PHYSICIAN ASSISTANT

## 2017-08-10 DIAGNOSIS — N52.9 ERECTILE DYSFUNCTION, UNSPECIFIED ERECTILE DYSFUNCTION TYPE: ICD-10-CM

## 2017-08-11 RX ORDER — TADALAFIL 20 MG
TABLET ORAL
Qty: 10 TABLET | Refills: 3 | Status: SHIPPED | OUTPATIENT
Start: 2017-08-11 | End: 2018-05-06

## 2017-08-23 ENCOUNTER — MYC MEDICAL ADVICE (OUTPATIENT)
Dept: FAMILY MEDICINE | Facility: CLINIC | Age: 62
End: 2017-08-23

## 2017-08-23 NOTE — TELEPHONE ENCOUNTER
RN called and spoke with patient regarding concerning symptoms listed in my chart message . He is currently at work.   He reports that he has had this pain for the past few days. Reports the pain is constant to a degree.   He has been experiencing SOB that becomes worse while lying down. Feels like he has to work harder to breathe.  His rib area feel sore and ache. He has not been sick or soughing lately.   Denies pain up down his arm or up into his jaw.     RN advised patient that he should be seen in the ER to rule out cardiac problems. They can then look at his lungs as well.    Patient verbalized understanding.     No further questions or concerns at this time.  Beti Boudreaux RN   [facility]

## 2017-09-18 DIAGNOSIS — E03.9 HYPOTHYROIDISM, UNSPECIFIED TYPE: ICD-10-CM

## 2017-09-18 RX ORDER — LEVOTHYROXINE SODIUM 112 UG/1
112 TABLET ORAL DAILY
Qty: 90 TABLET | Refills: 2 | Status: SHIPPED | OUTPATIENT
Start: 2017-09-18 | End: 2018-06-30

## 2017-10-04 DIAGNOSIS — L64.9 MALE PATTERN BALDNESS: ICD-10-CM

## 2017-10-04 RX ORDER — FINASTERIDE 1 MG/1
TABLET, FILM COATED ORAL
Qty: 90 TABLET | Refills: 0 | Status: SHIPPED | OUTPATIENT
Start: 2017-10-04 | End: 2018-01-26

## 2017-10-22 DIAGNOSIS — G60.9 IDIOPATHIC NEUROPATHY: ICD-10-CM

## 2017-10-22 DIAGNOSIS — G89.4 CHRONIC PAIN SYNDROME: ICD-10-CM

## 2017-10-24 NOTE — TELEPHONE ENCOUNTER
Controlled Substance Refill Request for tramadol     Problem List Complete:  Yes   checked in past 6 months?  Yes 10/24/17, no concerns     Patient is followed by SHWETA WARNER for ongoing prescription of pain medication.  All refills should be approved by this provider, or covering partner.     Medication(s): tramadol .   Maximum quantity per month: 90  Clinic visit frequency required: Q 6  months      Controlled substance agreement on file: Yes 11/30/2016        Pain Clinic evaluation in the past: Yes in 2011 with Dr Gricelda Tyson RN

## 2017-10-25 RX ORDER — TRAMADOL HYDROCHLORIDE 50 MG/1
TABLET ORAL
Qty: 270 TABLET | Refills: 0 | Status: SHIPPED | OUTPATIENT
Start: 2017-10-25 | End: 2018-01-07

## 2017-10-25 NOTE — TELEPHONE ENCOUNTER
The requested prescription(s) has/have been approved and has/have been printed and signed. This note was forwarded to the patient care pool to contact the patient to arrange for the patient to obtain the signed prescription(s).  Bello Granda

## 2017-11-09 ASSESSMENT — ENCOUNTER SYMPTOMS
DIZZINESS: 0
LOSS OF CONSCIOUSNESS: 0
MEMORY LOSS: 0
NUMBNESS: 1
DOUBLE VISION: 0
TINGLING: 1
EYE IRRITATION: 0
EYE REDNESS: 0
PARALYSIS: 0
TREMORS: 0
DISTURBANCES IN COORDINATION: 0
HEADACHES: 0
EYE WATERING: 0
SPEECH CHANGE: 0
EYE PAIN: 0
WEAKNESS: 0

## 2017-11-14 ENCOUNTER — OFFICE VISIT (OUTPATIENT)
Dept: NEUROLOGY | Facility: CLINIC | Age: 62
End: 2017-11-14

## 2017-11-14 VITALS
SYSTOLIC BLOOD PRESSURE: 121 MMHG | HEART RATE: 74 BPM | WEIGHT: 201 LBS | HEIGHT: 72 IN | DIASTOLIC BLOOD PRESSURE: 64 MMHG | BODY MASS INDEX: 27.22 KG/M2

## 2017-11-14 DIAGNOSIS — G60.3 IDIOPATHIC PROGRESSIVE NEUROPATHY: Primary | ICD-10-CM

## 2017-11-14 ASSESSMENT — PAIN SCALES - GENERAL: PAINLEVEL: NO PAIN (0)

## 2017-11-14 NOTE — LETTER
2017       RE: Navneet Toussaint  [address] [facility]  [place] [address]     Dear Colleague,    Thank you for referring your patient, Navneet Toussaint, to the [facility] NEUROLOGY at [facility]. Please see a copy of my visit note below.    2017      Bello Bruno MD   [facility]    [address] [place] [address]      RE: Navneet Toussaint   MRN: [id number]   : 1955      Dear Dr. Bruno:        Mr. Toussaint was seen for followup of his sensory neuropathy.  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      His sural nerve had decreased in amplitude during tests.      He is still symptomatic, uses gabapentin 2400 for relief with some tramadol.  He has had the paresthesias for several years now.      He has not developed any weakness.      PHYSICAL EXAMINATION:  He is a very pleasant man.  His gait looks okay.  His motor exam shows no focal paralysis on the lower extremity or upper extremity.  The ankle reflexes are obtainable.  Vibratory sensation is profoundly affected.      There are no autonomic signs.      In summary, we are at a loss to explain his neuropathy.  His workup has been negative.  We will ask Dr. Carvajal if he can give any further suggestions for the etiology of the sensory neuropathy.      Sincerely,       Kris Najera MD                 D: 2017 13:52   T: 2017 14:35   MT: AKCHAYO      Name:     NAVNEET TOUSSAINT   MRN:      [id number]        Account:      [id number]   :      1955           Service Date: 2017      Document: [id number]

## 2017-11-14 NOTE — MR AVS SNAPSHOT
After Visit Summary   11/14/2017    Navneet Toussaint    MRN: [id number]           Patient Information     Date Of Birth          1955        Visit Information        Provider Department      11/14/2017 1:00 PM Kris Najera MD [facility] Neurology        Today's Diagnoses     Idiopathic progressive neuropathy    -  1       Follow-ups after your visit        Who to contact     Please call your clinic at [phone] to:    Ask questions about your health    Make or cancel appointments    Discuss your medicines    Learn about your test results    Speak to your doctor   If you have compliments or concerns about an experience at your clinic, or if you wish to file a complaint, please contact [facility] Patient Relations at [phone] or email us at [email or web address]         Additional Information About Your Visit        MyChart Information     OnVantaget gives you secure access to your electronic health record. If you see a primary care provider, you can also send messages to your care team and make appointments. If you have questions, please call your primary care clinic.  If you do not have a primary care provider, please call [phone] and they will assist you.      Section 101 is an electronic gateway that provides easy, online access to your medical records. With Section 101, you can request a clinic appointment, read your test results, renew a prescription or communicate with your care team.     To access your existing account, please contact your [facility] or call [phone] for assistance.        Care EveryWhere ID     This is your Care EveryWhere ID. This could be used by other organizations to access your [place] medical records  [id number]        Your Vitals Were     Pulse Height BMI (Body Mass Index)             74 1.829 m (6') 27.26 kg/m2          Blood Pressure from Last 3 Encounters:   11/14/17 121/64   06/20/17 115/64    05/23/17 111/62    Weight from Last 3 Encounters:   11/14/17 91.2 kg (201 lb)   06/20/17 88 kg (194 lb)   05/23/17 85.7 kg (189 lb)              Today, you had the following     No orders found for display       Primary Care Provider Office Phone # Fax #    Bello Bruno [phone] [address] [address] [address]        Equal Access to Services     [facility] : Hadii aad ku hadasho Soomaali, waaxda luqadaha, qaybta kaalmada adeegyada, waxay idiin hayaan adeeg kharamoe la'vielka . So [facility] [phone].    ATENCIÓN: Si habla español, tiene a florian disposición servicios gratuitos de asistencia lingüística. [facility] [phone].    We comply with applicable federal civil rights laws and [place] laws. We do not discriminate on the basis of race, color, national origin, age, disability, sex, sexual orientation, or gender identity.            Thank you!     Thank you for choosing [facility] NEUROLOGY  for your care. Our goal is always to provide you with excellent care. Hearing back from our patients is one way we can continue to improve our services. Please take a few minutes to complete the written survey that you may receive in the mail after your visit with us. Thank you!             Your Updated Medication List - Protect others around you: Learn how to safely use, store and throw away your medicines at www.disposemymeds.org.          This list is accurate as of: 11/14/17 11:59 PM.  Always use your most recent med list.                   Brand Name Dispense Instructions for use Diagnosis    azithromycin 250 MG tablet    ZITHROMAX Z-ESTHELA    6 tablet    2 tabs day one then 1 tab qd    Cough       CIALIS 20 MG tablet   Generic drug:  tadalafil     10 tablet    TAKE 1 TABLET (20 MG) BY MOUTH DAILY AS NEEDED FOR ERECTILE DYSFUNCTION    Erectile dysfunction, unspecified erectile dysfunction type       finasteride 1 MG tablet    PROPECIA    90 tablet    TAKE 1 TABLET (1 MG) BY MOUTH DAILY     Male pattern baldness       fluticasone 50 MCG/ACT spray    FLONASE    16 g    Spray 2 sprays into both nostrils daily    Seasonal allergic rhinitis, unspecified allergic rhinitis trigger       gabapentin 800 MG tablet    NEURONTIN    270 tablet    TAKE 1 TABLET BY MOUTH 3 TIMES DAILY    Idiopathic peripheral neuropathy       levothyroxine 112 MCG tablet    SYNTHROID/LEVOTHROID    90 tablet    Take 1 tablet (112 mcg) by mouth daily    Hypothyroidism, unspecified type       omeprazole 40 MG capsule    priLOSEC    30 capsule    Take 1 capsule (40 mg) by mouth daily Take 30-60 minutes before a meal.    Gastroesophageal reflux disease without esophagitis       order for DME      Respironics Auto CPAP 5-15cm H2O        order for DME      Respironics REMSTAR 60 Series Auto CPAP 5-15cm H20, Nuance pillows w/medium pillows        pregabalin 75 MG capsule    LYRICA    90 capsule    Take 1 capsule (75 mg) by mouth 3 times daily    Other polyneuropathy       tamsulosin 0.4 MG capsule    FLOMAX    90 capsule    TAKE 1 CAPSULE (0.4 MG) BY MOUTH DAILY    Benign non-nodular prostatic hyperplasia with lower urinary tract symptoms       * TraMADol HCl 50 MG Tbdp     270 tablet    Take 1-2 tablets by mouth every 4 hours as needed Maximum is 8 tabs in a day    Idiopathic neuropathy, Chronic pain syndrome       * traMADol 50 MG tablet    ULTRAM    270 tablet    TAKE 1 TO 2 TABLETS BY MOUTH EVERY 4 HOURS AS NEEDED .MAX 8 TABS IN A DAY    Idiopathic neuropathy, Chronic pain syndrome       * Notice:  This list has 2 medication(s) that are the same as other medications prescribed for you. Read the directions carefully, and ask your doctor or other care provider to review them with you.

## 2017-11-14 NOTE — PROGRESS NOTES
2017      Bello Bruno MD   [facility]    [address] [place] [address]      RE: Navneet Toussaint   MRN: [id number]   : 1955      Dear Dr. Bruno:        Mr. Toussaint was seen for followup of his sensory neuropathy.  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      His sural nerve had decreased in amplitude during tests.      He is still symptomatic, uses gabapentin 2400 for relief with some tramadol.  He has had the paresthesias for several years now.      He has not developed any weakness.      PHYSICAL EXAMINATION:  He is a very pleasant man.  His gait looks okay.  His motor exam shows no focal paralysis on the lower extremity or upper extremity.  The ankle reflexes are obtainable.  Vibratory sensation is profoundly affected.      There are no autonomic signs.      In summary, we are at a loss to explain his neuropathy.  His workup has been negative.  We will ask Dr. Carvajal if he can give any further suggestions for the etiology of the sensory neuropathy.      Sincerely,       MD LINDA Chahal MD             D: 2017 13:52   T: 2017 14:35   MT: AKCHAYO      Name:     NAVNEET TOUSSAINT   MRN:      [id number]        Account:      [id number]   :      1955           Service Date: 2017      Document: [id number]

## 2018-01-10 DIAGNOSIS — G60.9 IDIOPATHIC NEUROPATHY: ICD-10-CM

## 2018-01-10 DIAGNOSIS — G89.4 CHRONIC PAIN SYNDROME: ICD-10-CM

## 2018-01-10 RX ORDER — TRAMADOL HYDROCHLORIDE 50 MG/1
TABLET ORAL
OUTPATIENT
Start: 2018-01-10

## 2018-01-11 RX ORDER — TRAMADOL HYDROCHLORIDE 50 MG/1
TABLET ORAL
OUTPATIENT
Start: 2018-01-11

## 2018-01-26 DIAGNOSIS — L64.9 MALE PATTERN BALDNESS: ICD-10-CM

## 2018-01-26 RX ORDER — FINASTERIDE 1 MG/1
TABLET, FILM COATED ORAL
Qty: 90 TABLET | Refills: 0 | Status: SHIPPED | OUTPATIENT
Start: 2018-01-26 | End: 2018-06-02

## 2018-01-26 NOTE — TELEPHONE ENCOUNTER
Routing refill request to provider for review/approval because:  Drug not on the [facility] refill protocol         Marisela Soriano RN

## 2018-01-30 ENCOUNTER — TELEPHONE (OUTPATIENT)
Dept: OPHTHALMOLOGY | Facility: CLINIC | Age: 63
End: 2018-01-30

## 2018-01-30 NOTE — TELEPHONE ENCOUNTER
Patient is having pain under the left upper eyelid for 2 days now.  Eye wash made it hurt even worse when he tried to rinse.  Told him we can see him tomorrow at 8:45 then make a cataract eval appt for a little ways out.  He agreed to plan and will be in at 845 with Angela.

## 2018-01-31 ENCOUNTER — OFFICE VISIT (OUTPATIENT)
Dept: OPHTHALMOLOGY | Facility: CLINIC | Age: 63
End: 2018-01-31
Payer: COMMERCIAL

## 2018-01-31 DIAGNOSIS — H57.12 PAIN OF LEFT EYE: Primary | ICD-10-CM

## 2018-01-31 PROCEDURE — 92012 INTRM OPH EXAM EST PATIENT: CPT | Performed by: OPHTHALMOLOGY

## 2018-01-31 RX ORDER — NEOMYCIN SULFATE, POLYMYXIN B SULFATE AND DEXAMETHASONE 3.5; 10000; 1 MG/ML; [USP'U]/ML; MG/ML
1 SUSPENSION/ DROPS OPHTHALMIC 4 TIMES DAILY
Qty: 1 BOTTLE | Refills: 1 | Status: SHIPPED | OUTPATIENT
Start: 2018-01-31 | End: 2018-03-13

## 2018-01-31 ASSESSMENT — VISUAL ACUITY
OS_PH_SC: 20/25
METHOD: SNELLEN - LINEAR
OD_SC: 20/20
OS_SC: 20/40

## 2018-01-31 ASSESSMENT — TONOMETRY
IOP_METHOD: TONOPEN
OS_IOP_MMHG: 14
OD_IOP_MMHG: 14

## 2018-01-31 ASSESSMENT — EXTERNAL EXAM - LEFT EYE: OS_EXAM: MILD-MOD BROW, 1+ BROW PTOSIS

## 2018-01-31 ASSESSMENT — SLIT LAMP EXAM - LIDS: COMMENTS: 1+ DERMATOCHALASIS - UPPER LID

## 2018-01-31 ASSESSMENT — EXTERNAL EXAM - RIGHT EYE: OD_EXAM: 1+ BROW PTOSIS

## 2018-01-31 NOTE — PATIENT INSTRUCTIONS
Maxitrol: One drop left eye four times daily for 2-3 days.  Return visit next available for a complete exam.    Mark Miller M.D.

## 2018-01-31 NOTE — PROGRESS NOTES
Current Eye Medications:       Subjective:    Pt reports for the past 3 days has this pain underneath his left eye lid, left eye has this dull ache and burning pain. No mattering.     Objective:  See Ophthalmology Exam.       Assessment:  Left eye pain of indeterminate etiology.      Plan:  Maxitrol: One drop left eye four times daily for 2-3 days.  Return visit next available for a complete exam.    Mark Miller M.D.

## 2018-01-31 NOTE — LETTER
1/31/2018         RE: Navneet Toussaint  [address]  [place] [address]        Dear Colleague,    Thank you for referring your patient, Navneet Toussaint, to the [facility]. Please see a copy of my visit note below.     Current Eye Medications:       Subjective:    Pt reports for the past 3 days has this pain underneath his left eye lid, left eye has this dull ache and burning pain. No mattering.     Objective:  See Ophthalmology Exam.       Assessment:  Left eye pain of indeterminate etiology.      Plan:  Maxitrol: One drop left eye four times daily for 2-3 days.  Return visit next available for a complete exam.    Mark Miller M.D.           Again, thank you for allowing me to participate in the care of your patient.        Sincerely,        Mark Miller MD

## 2018-01-31 NOTE — MR AVS SNAPSHOT
After Visit Summary   1/31/2018    Navneet Toussaint    MRN: [id number]           Patient Information     Date Of Birth          1955        Visit Information        Provider Department      1/31/2018 8:45 AM Mark Miller MD [facility]        Today's Diagnoses     Viral conjunctivitis of left eye    -  1      Care Instructions    Maxitrol: One drop left eye four times daily for 2-3 days.  Return visit next available for a complete exam.    Mark Miller M.D.            Follow-ups after your visit        Who to contact     If you have questions or need follow up information about today's clinic visit or your schedule please contact [facility] directly at [phone].  Normal or non-critical lab and imaging results will be communicated to you by Keelvarhart, letter or phone within 4 business days after the clinic has received the results. If you do not hear from us within 7 days, please contact the clinic through Keelvarhart or phone. If you have a critical or abnormal lab result, we will notify you by phone as soon as possible.  Submit refill requests through LogicLadder or call your pharmacy and they will forward the refill request to us. Please allow 3 business days for your refill to be completed.          Additional Information About Your Visit        MyChart Information     LogicLadder gives you secure access to your electronic health record. If you see a primary care provider, you can also send messages to your care team and make appointments. If you have questions, please call your primary care clinic.  If you do not have a primary care provider, please call [phone] and they will assist you.        Care EveryWhere ID     This is your Care EveryWhere ID. This could be used by other organizations to access your [place] medical records  [id number]         Blood Pressure from Last 3 Encounters:   11/14/17 121/64   06/20/17 115/64   05/23/17 111/62    Weight from Last 3  Encounters:   11/14/17 91.2 kg (201 lb)   06/20/17 88 kg (194 lb)   05/23/17 85.7 kg (189 lb)              Today, you had the following     No orders found for display         Today's Medication Changes          These changes are accurate as of 1/31/18  9:20 AM.  If you have any questions, ask your nurse or doctor.               Start taking these medicines.        Dose/Directions    neomycin-polymyxin-dexamethasone 3.5-41231-4.1 Susp ophthalmic susp   Commonly known as:  MAXITROL   Used for:  Viral conjunctivitis of left eye        Dose:  1 drop   Place 1 drop Into the left eye 4 times daily   Quantity:  1 Bottle   Refills:  1            Where to get your medicines      These medications were sent to [facility]/pharmacy #[id number] - [place], [place] - [facility] [address] [address]     Phone:  [phone]     neomycin-polymyxin-dexamethasone 3.5-63124-5.1 Susp ophthalmic susp                Primary Care Provider Office Phone # Fax #    Bello Bruno [phone] [address] [facility] [address]        Equal Access to Services     [facility] : Hadii pepito jacques Sokoko, waaxda luqadaha, qaybta kaalmada adeloryyaemily, cory ospina . So [facility] [phone].    ATENCIÓN: Si habla español, tiene a florian disposición servicios gratuitos de asistencia lingüística. [facility] [phone].    We comply with applicable federal civil rights laws and [place] laws. We do not discriminate on the basis of race, color, national origin, age, disability, sex, sexual orientation, or gender identity.            Thank you!     Thank you for choosing [facility]  for your care. Our goal is always to provide you with excellent care. Hearing back from our patients is one way we can continue to improve our services. Please take a few minutes to complete the written survey that you may receive in the mail after your visit  with us. Thank you!             Your Updated Medication List - Protect others around you: Learn how to safely use, store and throw away your medicines at www.disposemymeds.org.          This list is accurate as of 1/31/18  9:20 AM.  Always use your most recent med list.                   Brand Name Dispense Instructions for use Diagnosis    azithromycin 250 MG tablet    ZITHROMAX Z-ESTHELA    6 tablet    2 tabs day one then 1 tab qd    Cough       CIALIS 20 MG tablet   Generic drug:  tadalafil     10 tablet    TAKE 1 TABLET (20 MG) BY MOUTH DAILY AS NEEDED FOR ERECTILE DYSFUNCTION    Erectile dysfunction, unspecified erectile dysfunction type       finasteride 1 MG tablet    PROPECIA    90 tablet    TAKE 1 TABLET BY MOUTH ONCE DAILY    Male pattern baldness       fluticasone 50 MCG/ACT spray    FLONASE    16 g    Spray 2 sprays into both nostrils daily    Seasonal allergic rhinitis, unspecified allergic rhinitis trigger       gabapentin 800 MG tablet    NEURONTIN    270 tablet    TAKE 1 TABLET BY MOUTH 3 TIMES DAILY    Idiopathic peripheral neuropathy       levothyroxine 112 MCG tablet    SYNTHROID/LEVOTHROID    90 tablet    Take 1 tablet (112 mcg) by mouth daily    Hypothyroidism, unspecified type       neomycin-polymyxin-dexamethasone 3.5-55797-1.1 Susp ophthalmic susp    MAXITROL    1 Bottle    Place 1 drop Into the left eye 4 times daily    Viral conjunctivitis of left eye       omeprazole 40 MG capsule    priLOSEC    30 capsule    Take 1 capsule (40 mg) by mouth daily Take 30-60 minutes before a meal.    Gastroesophageal reflux disease without esophagitis       order for DME      Respironics Auto CPAP 5-15cm H2O        order for DME      Respironics REMSTAR 60 Series Auto CPAP 5-15cm H20, Nuance pillows w/medium pillows        pregabalin 75 MG capsule    LYRICA    90 capsule    Take 1 capsule (75 mg) by mouth 3 times daily    Other polyneuropathy       tamsulosin 0.4 MG capsule    FLOMAX    90 capsule    TAKE 1  CAPSULE (0.4 MG) BY MOUTH DAILY    Benign non-nodular prostatic hyperplasia with lower urinary tract symptoms       * TraMADol HCl 50 MG Tbdp     270 tablet    Take 1-2 tablets by mouth every 4 hours as needed Maximum is 8 tabs in a day    Idiopathic neuropathy, Chronic pain syndrome       * traMADol 50 MG tablet    ULTRAM    270 tablet    TAKE 1 TO 2 TABLETS BY MOUTH EVERY 4 HOURS AS NEEDED .MAX 8 TABS IN A DAY    Idiopathic neuropathy, Chronic pain syndrome       * Notice:  This list has 2 medication(s) that are the same as other medications prescribed for you. Read the directions carefully, and ask your doctor or other care provider to review them with you.

## 2018-02-08 ENCOUNTER — TRANSFERRED RECORDS (OUTPATIENT)
Dept: HEALTH INFORMATION MANAGEMENT | Facility: CLINIC | Age: 63
End: 2018-02-08

## 2018-02-23 PROBLEM — C61 PROSTATE CANCER (H): Status: ACTIVE | Noted: 2018-02-23

## 2018-03-08 ENCOUNTER — OFFICE VISIT (OUTPATIENT)
Dept: OPHTHALMOLOGY | Facility: CLINIC | Age: 63
End: 2018-03-08
Payer: COMMERCIAL

## 2018-03-08 DIAGNOSIS — H25.812 COMBINED FORMS OF AGE-RELATED CATARACT OF LEFT EYE: Primary | ICD-10-CM

## 2018-03-08 DIAGNOSIS — Z96.1 PSEUDOPHAKIA: ICD-10-CM

## 2018-03-08 PROCEDURE — 92014 COMPRE OPH EXAM EST PT 1/>: CPT | Performed by: OPHTHALMOLOGY

## 2018-03-08 ASSESSMENT — REFRACTION_MANIFEST
OD_CYLINDER: +0.25
OS_CYLINDER: +0.50
OS_ADD: +2.50
OS_SPHERE: +0.50
OD_AXIS: 180
OD_SPHERE: PLANO
OD_ADD: +2.50
OS_AXIS: 160

## 2018-03-08 ASSESSMENT — VISUAL ACUITY
OD_SC: 20/20
OS_SC: 20/30
CORRECTION_TYPE: GLASSES
METHOD_MR: FOR DIAGNOSTIC PURPOSES
METHOD: SNELLEN - LINEAR
OS_BAT_HIGH: 20/40

## 2018-03-08 ASSESSMENT — TONOMETRY
OS_IOP_MMHG: 16
IOP_METHOD: APPLANATION
OD_IOP_MMHG: 16

## 2018-03-08 NOTE — MR AVS SNAPSHOT
After Visit Summary   3/8/2018    Navneet Toussaint    MRN: [id number]           Patient Information     Date Of Birth          1955        Visit Information        Provider Department      3/8/2018 3:45 PM Mark Miller MD [facility]        Today's Diagnoses     Combined forms of age-related cataract, mod, of left eye    -  1    Pseudophakia, od          Care Instructions    Will proceed with cataract surgery left eye.  Mark Miller M.D.            Follow-ups after your visit        Your next 10 appointments already scheduled     Mar 12, 2018  3:45 PM CDT   Pre-Op physical with Rubén Reynoso MD   [facility]    [address] [facility] [id number] [phone]            Mar 13, 2018  2:30 PM CDT   [facility] Visit with Mark Miller MD   [facility]    [address] [place] [address] [phone]            Mar 20, 2018  2:15 PM CDT   Return Visit with Mark Miller MD   [facility] ([facility])    [address] [place] [address] [phone]            Mar 27, 2018 12:45 PM CDT   [facility] Visit with Mark Miller MD   [facility] ([facility])    [address] [place] [id number] [phone]            Apr 27, 2018  8:45 AM CDT   [facility]    [address] [place] [address]   [phone]              Who to contact     If you have questions or need follow up information about today's clinic visit or your schedule please contact [facility] directly at [phone].  Normal or non-critical lab and imaging results will be communicated to you by MyChart, letter or phone within 4 business days after the clinic has received the results. If you do not hear from us within 7 days,  please contact the clinic through Starriser or phone. If you have a critical or abnormal lab result, we will notify you by phone as soon as possible.  Submit refill requests through Starriser or call your pharmacy and they will forward the refill request to us. Please allow 3 business days for your refill to be completed.          Additional Information About Your Visit        ListRunnerhart Information     Starriser gives you secure access to your electronic health record. If you see a primary care provider, you can also send messages to your care team and make appointments. If you have questions, please call your primary care clinic.  If you do not have a primary care provider, please call [phone] and they will assist you.        Care EveryWhere ID     This is your Care EveryWhere ID. This could be used by other organizations to access your [facility] medical records  [id number]         Blood Pressure from Last 3 Encounters:   11/14/17 121/64   06/20/17 115/64   05/23/17 111/62    Weight from Last 3 Encounters:   11/14/17 91.2 kg (201 lb)   06/20/17 88 kg (194 lb)   05/23/17 85.7 kg (189 lb)              We Performed the Following     Tamar-Operative Worksheet        Primary Care Provider Office Phone # Fax #    [facility] [phone] [address] [facility] [address]        Equal Access to Services     JANICE SONG : Hadii aad ku hadasho Soomaali, waaxda luqadaha, qaybta kaalmada adeegyada, cory prince. So [facility] [phone].    ATENCIÓN: Si habla español, tiene a florian disposición servicios gratuitos de asistencia lingüística. Llame al [phone].    We comply with applicable federal civil rights laws and [place] laws. We do not discriminate on the basis of race, color, national origin, age, disability, sex, sexual orientation, or gender identity.            Thank you!     Thank you for choosing [facility]  for your care. Our goal is always to  provide you with excellent care. Hearing back from our patients is one way we can continue to improve our services. Please take a few minutes to complete the written survey that you may receive in the mail after your visit with us. Thank you!             Your Updated Medication List - Protect others around you: Learn how to safely use, store and throw away your medicines at www.disposemymeds.org.          This list is accurate as of 3/8/18 11:59 PM.  Always use your most recent med list.                   Brand Name Dispense Instructions for use Diagnosis    azithromycin 250 MG tablet    ZITHROMAX Z-ESTHELA    6 tablet    2 tabs day one then 1 tab qd    Cough       CIALIS 20 MG tablet   Generic drug:  tadalafil     10 tablet    TAKE 1 TABLET (20 MG) BY MOUTH DAILY AS NEEDED FOR ERECTILE DYSFUNCTION    Erectile dysfunction, unspecified erectile dysfunction type       finasteride 1 MG tablet    PROPECIA    90 tablet    TAKE 1 TABLET BY MOUTH ONCE DAILY    Male pattern baldness       fluticasone 50 MCG/ACT spray    FLONASE    16 g    Spray 2 sprays into both nostrils daily    Seasonal allergic rhinitis, unspecified allergic rhinitis trigger       gabapentin 800 MG tablet    NEURONTIN    270 tablet    TAKE 1 TABLET BY MOUTH 3 TIMES DAILY    Idiopathic peripheral neuropathy       levothyroxine 112 MCG tablet    SYNTHROID/LEVOTHROID    90 tablet    Take 1 tablet (112 mcg) by mouth daily    Hypothyroidism, unspecified type       neomycin-polymyxin-dexamethasone [id number] Susp ophthalmic susp    MAXITROL    1 Bottle    Place 1 drop Into the left eye 4 times daily    Pain of left eye       omeprazole 40 MG capsule    priLOSEC    30 capsule    Take 1 capsule (40 mg) by mouth daily Take 30-60 minutes before a meal.    Gastroesophageal reflux disease without esophagitis       order for DME      Respironics Auto CPAP 5-15cm H2O        order for DME      Respironics REMSTAR 60 Series Auto CPAP 5-15cm H20, Nuance pillows w/medium  pillows        pregabalin 75 MG capsule    LYRICA    90 capsule    Take 1 capsule (75 mg) by mouth 3 times daily    Other polyneuropathy       tamsulosin 0.4 MG capsule    FLOMAX    90 capsule    TAKE 1 CAPSULE (0.4 MG) BY MOUTH DAILY    Benign non-nodular prostatic hyperplasia with lower urinary tract symptoms       * TraMADol HCl 50 MG Tbdp     270 tablet    Take 1-2 tablets by mouth every 4 hours as needed Maximum is 8 tabs in a day    Idiopathic neuropathy, Chronic pain syndrome       * traMADol 50 MG tablet    ULTRAM    270 tablet    TAKE 1 TO 2 TABLETS BY MOUTH EVERY 4 HOURS AS NEEDED .MAX 8 TABS IN A DAY    Idiopathic neuropathy, Chronic pain syndrome       * Notice:  This list has 2 medication(s) that are the same as other medications prescribed for you. Read the directions carefully, and ask your doctor or other care provider to review them with you.

## 2018-03-08 NOTE — PROGRESS NOTES
Current Eye Medications:  no     Subjective:  Cataract evaluation left eye    Pt reports that vision in his left eye  is becoming increasing more cloudy, much worse with night driving.    Patient frustrated with vision left eye.  Recent episode of left eye discomfort resolved.     Objective:  See Ophthalmology Exam.       Assessment:  Visually significant cataract left eye.      ICD-10-CM    1. Combined forms of age-related cataract, mod, of left eye H25.812 Tamar-Operative Worksheet   2. Pseudophakia, od Z96.1         Plan:   Will proceed with cataract surgery left eye.  Will call to schedule.  Mark Miller M.D.    Briefly discussed target left eye -1.00; revisit.

## 2018-03-08 NOTE — LETTER
3/8/2018         RE: Navneet Toussaint  [address] [address] [address]  [place] [address]        Dear Colleague,    Thank you for referring your patient, Navneet Toussaint, to the [facility]. Please see a copy of my visit note below.     Current Eye Medications:  no     Subjective:  Cataract evaluation left eye    Pt reports that vision in his left eye  is becoming increasing more cloudy, much worse with night driving.    Patient frustrated with vision left eye.  Recent episode of left eye discomfort resolved.     Objective:  See Ophthalmology Exam.       Assessment:  Visually significant cataract left eye.      ICD-10-CM    1. Combined forms of age-related cataract, mod, of left eye H25.812 Tamar-Operative Worksheet   2. Pseudophakia, od Z96.1         Plan:   Will proceed with cataract surgery left eye.  Will call to schedule.  Mark Miller M.D.    Briefly discussed target left eye -1.00; revisit.         Again, thank you for allowing me to participate in the care of your patient.        Sincerely,        Mark Miller MD

## 2018-03-10 PROBLEM — H25.812 COMBINED FORMS OF AGE-RELATED CATARACT OF LEFT EYE: Status: ACTIVE | Noted: 2018-03-10

## 2018-03-10 ASSESSMENT — EXTERNAL EXAM - LEFT EYE: OS_EXAM: MILD-MOD BROW, 1+ BROW PTOSIS

## 2018-03-10 ASSESSMENT — SLIT LAMP EXAM - LIDS
COMMENTS: 1+ DERMATOCHALASIS - UPPER LID
COMMENTS: 1+ DERMATOCHALASIS - UPPER LID

## 2018-03-10 ASSESSMENT — CUP TO DISC RATIO
OS_RATIO: 0.2
OD_RATIO: 0.2

## 2018-03-10 ASSESSMENT — EXTERNAL EXAM - RIGHT EYE: OD_EXAM: 1+ BROW PTOSIS

## 2018-03-12 NOTE — PROGRESS NOTES
[facility]  [address] [facility] [address] [phone]  Dept: [phone]    PRE-OP EVALUATION:  Today's date: 3/12/2018    Navneet Toussaint (: 1955) presents for pre-operative evaluation assessment as requested by Dr. Miller.  He requires evaluation and anesthesia risk assessment prior to undergoing surgery/procedure for treatment of cataract, left eye .    Fax number for surgical facility:   Primary Physician: [facility]  Type of Anesthesia Anticipated: to be determined    Patient has a Health Care Directive or Living Will:  NO    Preop Questions 3/13/2018   Who is doing your surgery? Dr Miller   What are you having done? Cataract removal   Date of Surgery/Procedure: 2018   1.  Do you have a history of Heart attack, stroke, stent, coronary bypass surgery, or other heart surgery? No   2.  Do you ever have any pain or discomfort in your chest? No   3.  Do you have a history of  Heart Failure? No   4.   Are you troubled by shortness of breath when:  walking on a level surface, or up a slight hill, or at night? No   5.  Do you currently have a cold, bronchitis or other respiratory infection? No   6.  Do you have a cough, shortness of breath, or wheezing? No   7.  Do you sometimes get pains in the calves of your legs when you walk? No   8. Do you or anyone in your family have previous history of blood clots? No   9.  Do you or does anyone in your family have a serious bleeding problem such as prolonged bleeding following surgeries or cuts? No   10. Have you ever had problems with anemia or been told to take iron pills? No   11. Have you had any abnormal blood loss such as black, tarry or bloody stools? No   12. Have you ever had a blood transfusion? No   13. Have you or any of your relatives ever had problems with anesthesia? No   14. Do you have sleep apnea, excessive snoring or daytime drowsiness? YES - sleep apnea   15. Do you have any prosthetic heart valves? No    16. Do you have prosthetic joints? No         HPI:     HPI related to upcoming procedure: Navneet will has a cataract of his left eye and will be having surgery.       HYPOTHYROIDISM - Patient has a longstanding history of chronic Hypothyroidism. Patient has been doing well, noting no tremor, insomnia, hair loss or changes in skin texture. Last TSH value of 1.51 mU/L. Continues to take medications as directed, without adverse reactions or side effects.                                                                                                                                                                                                                        .    MEDICAL HISTORY:     Patient Active Problem List    Diagnosis Date Noted     Senile cataract of left eye, unspecified age-related cataract type 03/14/2018     Priority: Medium     Combined forms of age-related cataract, mod, of left eye 03/10/2018     Priority: Medium     Prostate cancer (H), adenocarcinoma 02/23/2018     Priority: Medium     Internal hemorrhoids, seen on colonoscopy 05/09/2017     Priority: Medium     Diverticulosis of large intestine without hemorrhage, seen on colonoscopy 05/09/2017     Priority: Medium     Benign non-nodular prostatic hyperplasia with lower urinary tract symptoms 04/10/2017     Priority: Medium     Chronic pain syndrome 04/25/2016     Priority: Medium     Patient is followed by SHWETA WARNER for ongoing prescription of pain medication.  All refills should be approved by this provider, or covering partner.    Medication(s): tramadol .   Maximum quantity per month: 90  Clinic visit frequency required: Q 6  months     Controlled substance agreement on file: Yes 11/30/2016      Pain Clinic evaluation in the past: Yes in 2011 with Dr Gricelda DE LA CRUZ Total Score(s):  No flowsheet data found.    Last [facility] website verification:  none   [email or web address]         Lumbar radiculopathy 12/07/2015      Priority: Medium     Numbness in feet 12/07/2015     Priority: Medium     IGT (impaired glucose tolerance) 10/16/2015     Priority: Medium     High triglycerides 09/28/2015     Priority: Medium     Elevated prostate specific antigen (PSA), neg prostate bx 09/02/2015     Priority: Medium     Erectile dysfunction 05/28/2015     Priority: Medium     Pseudophakia, od 03/09/2015     Priority: Medium     Idiopathic peripheral neuropathy 04/01/2011     Priority: Medium     Diagn[facility] Dr Madison [facility]       Advanced directives, counseling/discussion 03/15/2011     Priority: Medium     Discussed Advance Directive planning with patient; information given to patient to review.    Mona Aparicio MA         Overweight (BMI 25.0-29.9) 03/15/2011     Priority: Medium     Hypothyroidism 02/02/2011     Priority: Medium     CARDIOVASCULAR SCREENING; LDL GOAL LESS THAN 160 10/31/2010     Priority: Medium     Male pattern baldness 06/07/2010     Priority: Medium     Social anxiety disorder      Priority: Medium      Past Medical History:   Diagnosis Date     Cataract, mod, od; mild-mod, os 2/24/2015     Hypothyroid      Prostate cancer (H), adenocarcinoma 2/23/2018     Sleep apnea     doesnt use CPAP machine      Social anxiety disorder      Past Surgical History:   Procedure Laterality Date     BIOPSY  August 2015    prostate biopsy     CATARACT IOL, RT/LT Right 03/2015     [facility] SURGICAL PATHOLOGY       COLONOSCOPY  2005     COLONOSCOPY WITH CO2 INSUFFLATION N/A 5/9/2017    Procedure: COLONOSCOPY WITH CO2 INSUFFLATION;  COLON SCREEN/ SYPURA;  Surgeon: Flex Jones MD;  Location: [facility] OR     VASECTOMY       Current Outpatient Prescriptions   Medication Sig Dispense Refill     finasteride (PROPECIA) 1 MG tablet TAKE 1 TABLET BY MOUTH ONCE DAILY 90 tablet 0     traMADol (ULTRAM) 50 MG tablet TAKE 1 TO 2 TABLETS BY MOUTH EVERY 4 HOURS AS NEEDED .MAX 8 TABS IN A  tablet 0     levothyroxine (SYNTHROID/LEVOTHROID)  112 MCG tablet Take 1 tablet (112 mcg) by mouth daily 90 tablet 2     CIALIS 20 MG tablet TAKE 1 TABLET (20 MG) BY MOUTH DAILY AS NEEDED FOR ERECTILE DYSFUNCTION 10 tablet 3     gabapentin (NEURONTIN) 800 MG tablet TAKE 1 TABLET BY MOUTH 3 TIMES DAILY 270 tablet 3     tamsulosin (FLOMAX) 0.4 MG capsule TAKE 1 CAPSULE (0.4 MG) BY MOUTH DAILY 90 capsule 3     fluticasone (FLONASE) 50 MCG/ACT nasal spray Spray 2 sprays into both nostrils daily 16 g 6     omeprazole (PRILOSEC) 40 MG capsule Take 1 capsule (40 mg) by mouth daily Take 30-60 minutes before a meal. 30 capsule 4     [START ON 3/20/2018] prednisoLONE acetate (PRED FORTE) 1 % ophthalmic susp Place 1 drop Into the left eye 3 times daily 5 mL 0     [START ON 3/20/2018] diclofenac (VOLTAREN) 0.1 % ophthalmic solution Place 1 drop Into the left eye 3 times daily 5 mL 0     OTC products: None, except as noted above    Allergies   Allergen Reactions     Nkda [No Known Drug Allergies]       Latex Allergy: NO    Social History   Substance Use Topics     Smoking status: Former Smoker     Types: Cigarettes     Quit date: 10/1/1979     Smokeless tobacco: Never Used      Comment: Lives in smokefree house     Alcohol use No      Comment: weekly     History   Drug Use No       REVIEW OF SYSTEMS:   Constitutional, neuro, ENT, endocrine, pulmonary, cardiac, gastrointestinal, genitourinary, musculoskeletal, integument and psychiatric systems are negative, except as otherwise noted.    EXAM:   /62  Pulse 64  Temp 97.9  F (36.6  C) (Oral)  Resp 14  Wt 206 lb (93.4 kg)  SpO2 97%  BMI 27.94 kg/m2    GENERAL APPEARANCE: healthy, alert and no distress     EYES: EOMI,  PERRL     HENT: ear canals and TM's normal and nose and mouth without ulcers or lesions     NECK: no adenopathy, no asymmetry, masses, or scars and thyroid normal to palpation     RESP: lungs clear to auscultation - no rales, rhonchi or wheezes     CV: regular rates and rhythm, normal S1 S2, no S3 or S4 and  no murmur, click or rub     ABDOMEN:  soft, nontender, no HSM or masses and bowel sounds normal     MS: extremities normal- no gross deformities noted, no evidence of inflammation in joints, FROM in all extremities.     SKIN: no suspicious lesions or rashes     NEURO: Normal strength and tone, sensory exam grossly normal, mentation intact and speech normal     PSYCH: mentation appears normal. and affect normal/bright     LYMPHATICS: No cervical adenopathy    DIAGNOSTICS:   No labs or EKG required for low risk surgery (cataract, skin procedure, breast biopsy, etc)    Recent Labs   Lab Test  03/14/17   1051  10/09/15   0725  04/26/13   0726 04/06/11  11/30/10   1458   HGB   --    --    --   15.4  15.1   PLT   --    --    --   224  267   NA   --   140  139   --    --    POTASSIUM   --   4.4  4.2   --    --    CR   --   1.03  1.03   --    --    A1C  5.8   --    --    --    --         IMPRESSION:   Reason for surgery/procedure: Left cataract   Diagnosis/reason for consult: preoperative clearance    The proposed surgical procedure is considered LOW risk.    REVISED CARDIAC RISK INDEX  The patient has the following serious cardiovascular risks for perioperative complications such as (MI, PE, VFib and 3  AV Block):  No serious cardiac risks  INTERPRETATION: 0 risks: Class I (very low risk - 0.4% complication rate)    The patient has the following additional risks for perioperative complications:  No identified additional risks      ICD-10-CM    1. Preop general physical exam Z01.818    2. Senile cataract of left eye, unspecified age-related cataract type H25.9        RECOMMENDATIONS:         --Patient is to take all scheduled medications on the day of surgery    --Hypothyroidism: well controlled.     APPROVAL GIVEN to proceed with proposed procedure, without further diagnostic evaluation       Signed Electronically by: Rubén Reynoso MD    Copy of this evaluation report is provided to requesting  physician.    [facility] Preop Guidelines  I agree with the above plan  Rubén Reynoso MD

## 2018-03-13 ENCOUNTER — OFFICE VISIT (OUTPATIENT)
Dept: OPHTHALMOLOGY | Facility: CLINIC | Age: 63
End: 2018-03-13
Payer: COMMERCIAL

## 2018-03-13 ENCOUNTER — OFFICE VISIT (OUTPATIENT)
Dept: FAMILY MEDICINE | Facility: CLINIC | Age: 63
End: 2018-03-13
Payer: COMMERCIAL

## 2018-03-13 VITALS
OXYGEN SATURATION: 97 % | HEART RATE: 64 BPM | RESPIRATION RATE: 14 BRPM | BODY MASS INDEX: 27.94 KG/M2 | SYSTOLIC BLOOD PRESSURE: 116 MMHG | TEMPERATURE: 97.9 F | DIASTOLIC BLOOD PRESSURE: 62 MMHG | WEIGHT: 206 LBS

## 2018-03-13 DIAGNOSIS — Z01.818 PREOP GENERAL PHYSICAL EXAM: Primary | ICD-10-CM

## 2018-03-13 DIAGNOSIS — H25.812 COMBINED FORMS OF AGE-RELATED CATARACT OF LEFT EYE: Primary | ICD-10-CM

## 2018-03-13 DIAGNOSIS — H25.9 SENILE CATARACT OF LEFT EYE, UNSPECIFIED AGE-RELATED CATARACT TYPE: ICD-10-CM

## 2018-03-13 PROCEDURE — 92012 INTRM OPH EXAM EST PATIENT: CPT | Performed by: OPHTHALMOLOGY

## 2018-03-13 PROCEDURE — 99214 OFFICE O/P EST MOD 30 MIN: CPT | Performed by: PHYSICIAN ASSISTANT

## 2018-03-13 PROCEDURE — 92136 OPHTHALMIC BIOMETRY: CPT | Mod: 26 | Performed by: OPHTHALMOLOGY

## 2018-03-13 RX ORDER — PREDNISOLONE ACETATE 10 MG/ML
1 SUSPENSION/ DROPS OPHTHALMIC 3 TIMES DAILY
Qty: 5 ML | Refills: 0 | Status: SHIPPED | OUTPATIENT
Start: 2018-03-20 | End: 2018-07-09

## 2018-03-13 RX ORDER — DICLOFENAC SODIUM 1 MG/ML
1 SOLUTION/ DROPS OPHTHALMIC 3 TIMES DAILY
Qty: 5 ML | Refills: 0 | Status: SHIPPED | OUTPATIENT
Start: 2018-03-20 | End: 2018-07-09

## 2018-03-13 ASSESSMENT — VISUAL ACUITY
CORRECTION_TYPE: GLASSES
OS_SC: 20/40
OD_SC+: -2
OD_SC: 20/25
OS_SC+: -1
METHOD: SNELLEN - LINEAR

## 2018-03-13 ASSESSMENT — ANXIETY QUESTIONNAIRES
1. FEELING NERVOUS, ANXIOUS, OR ON EDGE: NOT AT ALL
2. NOT BEING ABLE TO STOP OR CONTROL WORRYING: NOT AT ALL
IF YOU CHECKED OFF ANY PROBLEMS ON THIS QUESTIONNAIRE, HOW DIFFICULT HAVE THESE PROBLEMS MADE IT FOR YOU TO DO YOUR WORK, TAKE CARE OF THINGS AT HOME, OR GET ALONG WITH OTHER PEOPLE: NOT DIFFICULT AT ALL
3. WORRYING TOO MUCH ABOUT DIFFERENT THINGS: NOT AT ALL
5. BEING SO RESTLESS THAT IT IS HARD TO SIT STILL: NOT AT ALL
GAD7 TOTAL SCORE: 1
7. FEELING AFRAID AS IF SOMETHING AWFUL MIGHT HAPPEN: NOT AT ALL
6. BECOMING EASILY ANNOYED OR IRRITABLE: SEVERAL DAYS

## 2018-03-13 ASSESSMENT — PAIN SCALES - GENERAL: PAINLEVEL: NO PAIN (0)

## 2018-03-13 ASSESSMENT — SLIT LAMP EXAM - LIDS
COMMENTS: 1+ DERMATOCHALASIS - UPPER LID
COMMENTS: 1+ DERMATOCHALASIS - UPPER LID

## 2018-03-13 ASSESSMENT — CUP TO DISC RATIO: OS_RATIO: 0.2

## 2018-03-13 ASSESSMENT — EXTERNAL EXAM - RIGHT EYE: OD_EXAM: 1+ BROW PTOSIS

## 2018-03-13 ASSESSMENT — EXTERNAL EXAM - LEFT EYE: OS_EXAM: MILD-MOD BROW, 1+ BROW PTOSIS

## 2018-03-13 ASSESSMENT — PATIENT HEALTH QUESTIONNAIRE - PHQ9: 5. POOR APPETITE OR OVEREATING: NOT AT ALL

## 2018-03-13 NOTE — PATIENT INSTRUCTIONS
PRE-OP CATARACT INSTRUCTIONS    *You will be picking up 2 eye drop bottles from your pharmacy.  You will use these the day after surgery.    *No solid food after midnight.    *Clear liquids: coffee (no cream), tea, water, and jello are OK before 7:15 A.M.  You may take your regular pills with this.    Mark Miller M.D.

## 2018-03-13 NOTE — MR AVS SNAPSHOT
After Visit Summary   3/13/2018    Navneet Toussaint    MRN: [id number]           Patient Information     Date Of Birth          1955        Visit Information        Provider Department      3/13/2018 2:30 PM Mark Miller MD [facility]        Today's Diagnoses     Combined forms of age-related cataract, mod, of left eye    -  1      Care Instructions    PRE-OP CATARACT INSTRUCTIONS    *You will be picking up 2 eye drop bottles from your pharmacy.  You will use these the day after surgery.    *No solid food after midnight.    *Clear liquids: coffee (no cream), tea, water, and jello are OK before 7:15 A.M.  You may take your regular pills with this.    Mark Miller M.D.            Follow-ups after your visit        Your next 10 appointments already scheduled     Mar 20, 2018  2:15 PM CDT   Return Visit with Mark Miller MD   [facility] ([facility]    [address] [place] [address]   [phone]            Mar 27, 2018 12:45 PM CDT   [facility] Visit with Mark Miller MD   [facility]    [address] [place] [address] [phone]            Apr 27, 2018  8:45 AM CDT   Return Visit with Mark Miller MD   [facility] ([facility])    [address] [place] [address]   [phone]              Who to contact     If you have questions or need follow up information about today's clinic visit or your schedule please contact [facility] directly at [phone].  Normal or non-critical lab and imaging results will be communicated to you by MyChart, letter or phone within 4 business days after the clinic has received the results. If you do not hear from us within 7 days, please contact the clinic through MyChart or phone. If you have a critical or abnormal lab result, we will notify you by phone as soon as possible.  Submit refill  requests through [employer] or call your pharmacy and they will forward the refill request to us. Please allow 3 business days for your refill to be completed.          Additional Information About Your Visit        [employer] Information     [employer] gives you secure access to your electronic health record. If you see a primary care provider, you can also send messages to your care team and make appointments. If you have questions, please call your primary care clinic.  If you do not have a primary care provider, please call [phone] and they will assist you.        Care EveryWhere ID     This is your Care EveryWhere ID. This could be used by other organizations to access your [place] medical records  [id number]         Blood Pressure from Last 3 Encounters:   03/13/18 116/62   11/14/17 121/64   06/20/17 115/64    Weight from Last 3 Encounters:   03/13/18 93.4 kg (206 lb)   11/14/17 91.2 kg (201 lb)   06/20/17 88 kg (194 lb)              We Performed the Following     IOL MASTER (2nd eye)          Today's Medication Changes          These changes are accurate as of 3/13/18  3:13 PM.  If you have any questions, ask your nurse or doctor.               Start taking these medicines.        Dose/Directions    diclofenac 0.1 % ophthalmic solution   Commonly known as:  VOLTAREN   Used for:  Combined forms of age-related cataract of left eye   Started by:  Mark Miller MD        Dose:  1 drop   Start taking on:  3/20/2018   Place 1 drop Into the left eye 3 times daily   Quantity:  5 mL   Refills:  0       prednisoLONE acetate 1 % ophthalmic susp   Commonly known as:  PRED FORTE   Used for:  Combined forms of age-related cataract of left eye   Started by:  Mark Miller MD        Dose:  1 drop   Start taking on:  3/20/2018   Place 1 drop Into the left eye 3 times daily   Quantity:  5 mL   Refills:  0            Where to get your medicines      These medications were sent to [facility]/pharmacy #[id number] - [place]  [address] [facility]  [address], [place] [facility] [place] [address]     Phone:  [phone]     diclofenac 0.1 % ophthalmic solution    prednisoLONE acetate 1 % ophthalmic susp                Primary Care Provider Office Phone # Fax #    Bello Bruno [phone] [address] [facility] [address]        Equal Access to Services     [facility] : Hadii aad ku hadasho Soomaali, waaxda luqadaha, qaybta kaalmada adeegyada, waxay idiin hayaan adeeg kharash la'aan ah. So [facility] [phone].    ATENCIÓN: Si erichla magdiel, tiene a florian disposición servicios gratuitos de asistencia lingüística. Luis Alberto al [phone].    We comply with applicable federal civil rights laws and [place] laws. We do not discriminate on the basis of race, color, national origin, age, disability, sex, sexual orientation, or gender identity.            Thank you!     Thank you for choosing [facility]  for your care. Our goal is always to provide you with excellent care. Hearing back from our patients is one way we can continue to improve our services. Please take a few minutes to complete the written survey that you may receive in the mail after your visit with us. Thank you!             Your Updated Medication List - Protect others around you: Learn how to safely use, store and throw away your medicines at www.disposemymeds.org.          This list is accurate as of 3/13/18  3:13 PM.  Always use your most recent med list.                   Brand Name Dispense Instructions for use Diagnosis    CIALIS 20 MG tablet   Generic drug:  tadalafil     10 tablet    TAKE 1 TABLET (20 MG) BY MOUTH DAILY AS NEEDED FOR ERECTILE DYSFUNCTION    Erectile dysfunction, unspecified erectile dysfunction type       diclofenac 0.1 % ophthalmic solution   Start taking on:  3/20/2018    VOLTAREN    5 mL    Place 1 drop Into the left eye 3 times daily    Combined forms of age-related cataract of left eye        finasteride 1 MG tablet    PROPECIA    90 tablet    TAKE 1 TABLET BY MOUTH ONCE DAILY    Male pattern baldness       fluticasone 50 MCG/ACT spray    FLONASE    16 g    Spray 2 sprays into both nostrils daily    Seasonal allergic rhinitis, unspecified allergic rhinitis trigger       gabapentin 800 MG tablet    NEURONTIN    270 tablet    TAKE 1 TABLET BY MOUTH 3 TIMES DAILY    Idiopathic peripheral neuropathy       levothyroxine 112 MCG tablet    SYNTHROID/LEVOTHROID    90 tablet    Take 1 tablet (112 mcg) by mouth daily    Hypothyroidism, unspecified type       omeprazole 40 MG capsule    priLOSEC    30 capsule    Take 1 capsule (40 mg) by mouth daily Take 30-60 minutes before a meal.    Gastroesophageal reflux disease without esophagitis       prednisoLONE acetate 1 % ophthalmic susp   Start taking on:  3/20/2018    PRED FORTE    5 mL    Place 1 drop Into the left eye 3 times daily    Combined forms of age-related cataract of left eye       tamsulosin 0.4 MG capsule    FLOMAX    90 capsule    TAKE 1 CAPSULE (0.4 MG) BY MOUTH DAILY    Benign non-nodular prostatic hyperplasia with lower urinary tract symptoms       traMADol 50 MG tablet    ULTRAM    270 tablet    TAKE 1 TO 2 TABLETS BY MOUTH EVERY 4 HOURS AS NEEDED .MAX 8 TABS IN A DAY    Idiopathic neuropathy, Chronic pain syndrome

## 2018-03-13 NOTE — MR AVS SNAPSHOT
After Visit Summary   3/13/2018    Navneet Toussaint    MRN: [id number]           Patient Information     Date Of Birth          1955        Visit Information        Provider Department      3/13/2018 11:00 AM Kehr, Kristen M, PA-C [facility]        Today's Diagnoses     Preop general physical exam    -  1      Care Instructions      Before Your Surgery      Call your surgeon if there is any change in your health. This includes signs of a cold or flu (such as a sore throat, runny nose, cough, rash or fever).    Do not smoke, drink alcohol or take over the counter medicine (unless your surgeon or primary care doctor tells you to) for the 24 hours before and after surgery.    If you take prescribed drugs: Follow your doctor s orders about which medicines to take and which to stop until after surgery.    Eating and drinking prior to surgery: follow the instructions from your surgeon    Take a shower or bath the night before surgery. Use the soap your surgeon gave you to gently clean your skin. If you do not have soap from your surgeon, use your regular soap. Do not shave or scrub the surgery site.  Wear clean pajamas and have clean sheets on your bed.           Follow-ups after your visit        Your next 10 appointments already scheduled     Mar 20, 2018  2:15 PM CDT   [facility]    [address] [address]   [phone]            Mar 27, 2018 12:45 PM CDT   Return Visit with Mark Miller MD   [facility] ([facility]    [address]  [place] [address]   [phone]            Apr 27, 2018  8:45 AM CDT   Return Visit with Mark Miller MD   [facility]    [address] [address]   [phone]              Who to contact     If you have questions or need follow up information about  today's clinic visit or your schedule please contact [facility] directly at [phone].  Normal or non-critical lab and imaging results will be communicated to you by MyChart, letter or phone within 4 business days after the clinic has received the results. If you do not hear from us within 7 days, please contact the clinic through [employer] or phone. If you have a critical or abnormal lab result, we will notify you by phone as soon as possible.  Submit refill requests through [employer] or call your pharmacy and they will forward the refill request to us. Please allow 3 business days for your refill to be completed.          Additional Information About Your Visit        [employer] Information     [employer] gives you secure access to your electronic health record. If you see a primary care provider, you can also send messages to your care team and make appointments. If you have questions, please call your primary care clinic.  If you do not have a primary care provider, please call [phone] and they will assist you.        Care EveryWhere ID     This is your Care EveryWhere ID. This could be used by other organizations to access your [place] medical records  [id number]        Your Vitals Were     Pulse Temperature Respirations Pulse Oximetry BMI (Body Mass Index)       64 97.9  F (36.6  C) (Oral) 14 97% 27.94 kg/m2        Blood Pressure from Last 3 Encounters:   03/13/18 116/62   11/14/17 121/64   06/20/17 115/64    Weight from Last 3 Encounters:   03/13/18 206 lb (93.4 kg)   11/14/17 201 lb (91.2 kg)   06/20/17 194 lb (88 kg)              Today, you had the following     No orders found for display         Today's Medication Changes          These changes are accurate as of 3/13/18 11:59 PM.  If you have any questions, ask your nurse or doctor.               Start taking these medicines.        Dose/Directions    diclofenac 0.1 % ophthalmic solution   Commonly known as:  VOLTAREN   Used for:  Combined  forms of age-related cataract of left eye   Started by:  Mark Miller MD        Dose:  1 drop   Start taking on:  3/20/2018   Place 1 drop Into the left eye 3 times daily   Quantity:  5 mL   Refills:  0       prednisoLONE acetate 1 % ophthalmic susp   Commonly known as:  PRED FORTE   Used for:  Combined forms of age-related cataract of left eye   Started by:  Mark Miller MD        Dose:  1 drop   Start taking on:  3/20/2018   Place 1 drop Into the left eye 3 times daily   Quantity:  5 mL   Refills:  0            Where to get your medicines      These medications were sent to [facility]/pharmacy #[id number] - [place], [place] - [address] [facility]  [address], [place] [address]     Phone:  [phone]     diclofenac 0.1 % ophthalmic solution    prednisoLONE acetate 1 % ophthalmic susp                Primary Care Provider Office Phone # Fax #    Bello Bruno [phone] [address] [address]        Equal Access to Services     [facility] AH: Hadii aad ku hadasho Soomaali, waaxda luqadaha, qaybta kaalmada adeegyada, waxay idiin hayvielka prince. So [facility] [phone].    ATENCIÓN: Si habla español, tiene a florian disposición servicios gratuitos de asistencia lingüística. [facility] [phone].    We comply with applicable federal civil rights laws and [place] laws. We do not discriminate on the basis of race, color, national origin, age, disability, sex, sexual orientation, or gender identity.            Thank you!     Thank you for choosing [facility]  for your care. Our goal is always to provide you with excellent care. Hearing back from our patients is one way we can continue to improve our services. Please take a few minutes to complete the written survey that you may receive in the mail after your visit with us. Thank you!             Your Updated Medication List - Protect others around you: Learn how to  safely use, store and throw away your medicines at www.disposemymeds.org.          This list is accurate as of 3/13/18 11:59 PM.  Always use your most recent med list.                   Brand Name Dispense Instructions for use Diagnosis    CIALIS 20 MG tablet   Generic drug:  tadalafil     10 tablet    TAKE 1 TABLET (20 MG) BY MOUTH DAILY AS NEEDED FOR ERECTILE DYSFUNCTION    Erectile dysfunction, unspecified erectile dysfunction type       diclofenac 0.1 % ophthalmic solution   Start taking on:  3/20/2018    VOLTAREN    5 mL    Place 1 drop Into the left eye 3 times daily    Combined forms of age-related cataract of left eye       finasteride 1 MG tablet    PROPECIA    90 tablet    TAKE 1 TABLET BY MOUTH ONCE DAILY    Male pattern baldness       fluticasone 50 MCG/ACT spray    FLONASE    16 g    Spray 2 sprays into both nostrils daily    Seasonal allergic rhinitis, unspecified allergic rhinitis trigger       gabapentin 800 MG tablet    NEURONTIN    270 tablet    TAKE 1 TABLET BY MOUTH 3 TIMES DAILY    Idiopathic peripheral neuropathy       levothyroxine 112 MCG tablet    SYNTHROID/LEVOTHROID    90 tablet    Take 1 tablet (112 mcg) by mouth daily    Hypothyroidism, unspecified type       omeprazole 40 MG capsule    priLOSEC    30 capsule    Take 1 capsule (40 mg) by mouth daily Take 30-60 minutes before a meal.    Gastroesophageal reflux disease without esophagitis       prednisoLONE acetate 1 % ophthalmic susp   Start taking on:  3/20/2018    PRED FORTE    5 mL    Place 1 drop Into the left eye 3 times daily    Combined forms of age-related cataract of left eye       tamsulosin 0.4 MG capsule    FLOMAX    90 capsule    TAKE 1 CAPSULE (0.4 MG) BY MOUTH DAILY    Benign non-nodular prostatic hyperplasia with lower urinary tract symptoms       traMADol 50 MG tablet    ULTRAM    270 tablet    TAKE 1 TO 2 TABLETS BY MOUTH EVERY 4 HOURS AS NEEDED .MAX 8 TABS IN A DAY    Idiopathic neuropathy, Chronic pain syndrome

## 2018-03-13 NOTE — LETTER
3/13/2018         RE: Navneet Toussaint  [address]  [place] [address]        Dear Colleague,    Thank you for referring your patient, Navneet Toussaint, to the [facility]. Please see a copy of my visit note below.     Current Eye Medications:  None     Subjective: here for preop left eye. Scheduled for 3-19-18 at 1:15 pm. Consent signed.  Patient on Flomax for 3 years now.     Objective:  See Ophthalmology Exam.       Assessment: Visually significant cataract left eye.      Plan: Plan Kelman phacoemulsification/ posterior chamber lens left eye local standby.  Risks, benefits, complications, and alternatives discussed with patient including possibility of limitations from coexistent eye disease and loss of vision.  Target refraction and multifocal lens options discussed.  Patient understands and consents.  Mark Miller M.D.    Discussed implications from Flomax.  Will target left eye -1.00.           Again, thank you for allowing me to participate in the care of your patient.        Sincerely,        Mark Miller MD

## 2018-03-13 NOTE — NURSING NOTE
Chief Complaint   Patient presents with     Pre-Op Exam       Initial /62  Pulse 64  Temp 97.9  F (36.6  C) (Oral)  Resp 14  Wt 206 lb (93.4 kg)  SpO2 97%  BMI 27.94 kg/m2 Estimated body mass index is 27.94 kg/(m^2) as calculated from the following:    Height as of 11/14/17: 6' (1.829 m).    Weight as of this encounter: 206 lb (93.4 kg).  Medication Reconciliation: leslie Ortiz MA

## 2018-03-13 NOTE — PROGRESS NOTES
Current Eye Medications:  None     Subjective: here for preop left eye. Scheduled for 3-19-18 at 1:15 pm. Consent signed.  Patient on Flomax for 3 years now.     Objective:  See Ophthalmology Exam.       Assessment: Visually significant cataract left eye.      Plan: Plan Kelman phacoemulsification/ posterior chamber lens left eye local standby.  Risks, benefits, complications, and alternatives discussed with patient including possibility of limitations from coexistent eye disease and loss of vision.  Target refraction and multifocal lens options discussed.  Patient understands and consents.  Mark Miller M.D.    Discussed implications from Flomax.  Will target left eye -1.00.

## 2018-03-14 ENCOUNTER — TRANSFERRED RECORDS (OUTPATIENT)
Dept: HEALTH INFORMATION MANAGEMENT | Facility: CLINIC | Age: 63
End: 2018-03-14

## 2018-03-14 PROBLEM — H25.9 SENILE CATARACT OF LEFT EYE, UNSPECIFIED AGE-RELATED CATARACT TYPE: Status: ACTIVE | Noted: 2018-03-14

## 2018-03-14 ASSESSMENT — PATIENT HEALTH QUESTIONNAIRE - PHQ9: SUM OF ALL RESPONSES TO PHQ QUESTIONS 1-9: 3

## 2018-03-14 ASSESSMENT — ANXIETY QUESTIONNAIRES: GAD7 TOTAL SCORE: 1

## 2018-03-19 PROBLEM — H25.9 SENILE CATARACT OF LEFT EYE, UNSPECIFIED AGE-RELATED CATARACT TYPE: Status: RESOLVED | Noted: 2018-03-14 | Resolved: 2018-03-19

## 2018-03-19 PROBLEM — H25.812 COMBINED FORMS OF AGE-RELATED CATARACT OF LEFT EYE: Status: RESOLVED | Noted: 2018-03-10 | Resolved: 2018-03-19

## 2018-03-20 ENCOUNTER — OFFICE VISIT (OUTPATIENT)
Dept: OPHTHALMOLOGY | Facility: CLINIC | Age: 63
End: 2018-03-20
Payer: COMMERCIAL

## 2018-03-20 DIAGNOSIS — Z96.1 PSEUDOPHAKIA: Primary | ICD-10-CM

## 2018-03-20 PROCEDURE — 99024 POSTOP FOLLOW-UP VISIT: CPT | Performed by: OPHTHALMOLOGY

## 2018-03-20 ASSESSMENT — VISUAL ACUITY
OS_SC: 20/50
METHOD: SNELLEN - LINEAR
OS_PH_SC: 20/40-1
OS_SC+: -1
OD_SC: 20/25

## 2018-03-20 ASSESSMENT — TONOMETRY
OS_IOP_MMHG: 14
IOP_METHOD: APPLANATION

## 2018-03-20 ASSESSMENT — EXTERNAL EXAM - LEFT EYE: OS_EXAM: NORMAL

## 2018-03-20 ASSESSMENT — SLIT LAMP EXAM - LIDS: COMMENTS: NORMAL

## 2018-03-20 NOTE — PROGRESS NOTES
Current Eye Medications:  Prednisolone and Diclofenac three times a day left eye.     Subjective:  Po1, KPE left eye 3/19/18. Patient slept well last night. Vision is fine right. Vision is little blurry left eye, Peripheral vision temporal side is black. Zero pain both eyes, some soreness above left eye.    Possible dysphotopic symptoms      Objective:  See Ophthalmology Exam.       Assessment:  Doing well status/post Kelman phacoemulsification/ posterior chamber lens left eye.      Plan:   See Patient Instructions.

## 2018-03-20 NOTE — MR AVS SNAPSHOT
After Visit Summary   3/20/2018    Navneet Toussaint    MRN: [id number]           Patient Information     Date Of Birth          1955        Visit Information        Provider Department      3/20/2018 2:15 PM Mark Miller MD [facility]        Today's Diagnoses     Pseudophakia, ou    -  1      Care Instructions    POST-OP CATARACT INSTRUCTIONS    Use the following drops in the left eye:      Continue:  Prednisolone (pink or white cap) 3 times a day  Diclofenac (gray cap) 3 times a day    ~Wait at least 5 minutes between drops.    ~Wear eye shield at night for one week.    ~Do not exert yourself to the point that you are red in the face for one week.    ~If your vision worsens, eye becomes increasingly red, or becomes painful, call [phone].    ~If you are taking glaucoma medications, continue as usual.    ~OK to resume aspirin and/or other blood thinners.    Mark Miller M.D.                Follow-ups after your visit        Your next 10 appointments already scheduled     Mar 27, 2018 12:45 PM CDT   [facility] Visit with Mark Miller MD   [facility]    [address] [place] [id number] [phone]            Apr 27, 2018  8:45 AM CDT   Return Visit with Mark Miller MD   [facility] ([address] [address]   [phone]              Who to contact     If you have questions or need follow up information about today's clinic visit or your schedule please contact [facility] directly at [phone].  Normal or non-critical lab and imaging results will be communicated to you by MyChart, letter or phone within 4 business days after the clinic has received the results. If you do not hear from us within 7 days, please contact the clinic through MyChart or phone. If you have a critical or abnormal lab result, we will notify you by phone as soon as  possible.  Submit refill requests through [employer] or call your pharmacy and they will forward the refill request to us. Please allow 3 business days for your refill to be completed.          Additional Information About Your Visit        [employer] P[employer] Information     [employer] gives you secure access to your electronic health record. If you see a primary care provider, you can also send messages to your care team and make appointments. If you have questions, please call your primary care clinic.  If you do not have a primary care provider, please call [phone] and they will assist you.        Care EveryWhere ID     This is your Care EveryWhere ID. This could be used by other organizations to access your [facility] medical records  [id number]         Blood Pressure from Last 3 Encounters:   03/13/18 116/62   11/14/17 121/64   06/20/17 115/64    Weight from Last 3 Encounters:   03/13/18 93.4 kg (206 lb)   11/14/17 91.2 kg (201 lb)   06/20/17 88 kg (194 lb)              Today, you had the following     No orders found for display       Primary Care Provider Office Phone # Fax #    [facility] [facility] [phone] [address] [facility] [address]        Equal Access to Services     JANICE SONG : Hadii pepito lei hadasho Soomaali, waaxda luqadaha, qaybta kaalmada adeegyada, cory prince. So [facility] [phone].    ATENCIÓN: Si habla español, tiene a florian disposición servicios gratuitos de asistencia lingüística. Llame al [phone].    We comply with applicable federal civil rights laws and [place] laws. We do not discriminate on the basis of race, color, national origin, age, disability, sex, sexual orientation, or gender identity.            Thank you!     Thank you for choosing [facility]  for your care. Our goal is always to provide you with excellent care. Hearing back from our patients is one way we can continue to improve our services. Please take a few  minutes to complete the written survey that you may receive in the mail after your visit with us. Thank you!             Your Updated Medication List - Protect others around you: Learn how to safely use, store and throw away your medicines at www.disposemymeds.org.          This list is accurate as of 3/20/18  2:26 PM.  Always use your most recent med list.                   Brand Name Dispense Instructions for use Diagnosis    CIALIS 20 MG tablet   Generic drug:  tadalafil     10 tablet    TAKE 1 TABLET (20 MG) BY MOUTH DAILY AS NEEDED FOR ERECTILE DYSFUNCTION    Erectile dysfunction, unspecified erectile dysfunction type       diclofenac 0.1 % ophthalmic solution    VOLTAREN    5 mL    Place 1 drop Into the left eye 3 times daily    Combined forms of age-related cataract of left eye       finasteride 1 MG tablet    PROPECIA    90 tablet    TAKE 1 TABLET BY MOUTH ONCE DAILY    Male pattern baldness       fluticasone 50 MCG/ACT spray    FLONASE    16 g    Spray 2 sprays into both nostrils daily    Seasonal allergic rhinitis, unspecified allergic rhinitis trigger       gabapentin 800 MG tablet    NEURONTIN    270 tablet    TAKE 1 TABLET BY MOUTH 3 TIMES DAILY    Idiopathic peripheral neuropathy       levothyroxine 112 MCG tablet    SYNTHROID/LEVOTHROID    90 tablet    Take 1 tablet (112 mcg) by mouth daily    Hypothyroidism, unspecified type       omeprazole 40 MG capsule    priLOSEC    30 capsule    Take 1 capsule (40 mg) by mouth daily Take 30-60 minutes before a meal.    Gastroesophageal reflux disease without esophagitis       prednisoLONE acetate 1 % ophthalmic susp    PRED FORTE    5 mL    Place 1 drop Into the left eye 3 times daily    Combined forms of age-related cataract of left eye       tamsulosin 0.4 MG capsule    FLOMAX    90 capsule    TAKE 1 CAPSULE (0.4 MG) BY MOUTH DAILY    Benign non-nodular prostatic hyperplasia with lower urinary tract symptoms       traMADol 50 MG tablet    ULTRAM    270 tablet     TAKE 1 TO 2 TABLETS BY MOUTH EVERY 4 HOURS AS NEEDED .MAX 8 TABS IN A DAY    Idiopathic neuropathy, Chronic pain syndrome

## 2018-03-20 NOTE — LETTER
3/20/2018         RE: Navneet Toussaint  [address]  [place] [address]        Dear Colleague,    Thank you for referring your patient, Navneet Toussaint, to the [facility]. Please see a copy of my visit note below.     Current Eye Medications:  Prednisolone and Diclofenac three times a day left eye.     Subjective:  Po1, KPE left eye 3/19/18. Patient slept well last night. Vision is fine right. Vision is little blurry left eye, Peripheral vision temporal side is black. Zero pain both eyes, some soreness above left eye.    Possible dysphotopic symptoms      Objective:  See Ophthalmology Exam.       Assessment:  Doing well status/post Kelman phacoemulsification/ posterior chamber lens left eye.      Plan:   See Patient Instructions.           Again, thank you for allowing me to participate in the care of your patient.        Sincerely,        Mark Miller MD

## 2018-03-20 NOTE — PATIENT INSTRUCTIONS
POST-OP CATARACT INSTRUCTIONS    Use the following drops in the left eye:      Continue:  Prednisolone (pink or white cap) 3 times a day  Diclofenac (gray cap) 3 times a day    ~Wait at least 5 minutes between drops.    ~Wear eye shield at night for one week.    ~Do not exert yourself to the point that you are red in the face for one week.    ~If your vision worsens, eye becomes increasingly red, or becomes painful, call [phone].    ~If you are taking glaucoma medications, continue as usual.    ~OK to resume aspirin and/or other blood thinners.    Mark Miller M.D.

## 2018-03-27 ENCOUNTER — OFFICE VISIT (OUTPATIENT)
Dept: OPHTHALMOLOGY | Facility: CLINIC | Age: 63
End: 2018-03-27
Payer: COMMERCIAL

## 2018-03-27 DIAGNOSIS — Z96.1 PSEUDOPHAKIA: Primary | ICD-10-CM

## 2018-03-27 PROCEDURE — 99024 POSTOP FOLLOW-UP VISIT: CPT | Performed by: OPHTHALMOLOGY

## 2018-03-27 ASSESSMENT — REFRACTION_MANIFEST
OS_AXIS: 007
OS_SPHERE: -0.75
OS_CYLINDER: +1.25

## 2018-03-27 ASSESSMENT — TONOMETRY
IOP_METHOD: APPLANATION
OS_IOP_MMHG: 15

## 2018-03-27 ASSESSMENT — EXTERNAL EXAM - LEFT EYE: OS_EXAM: NORMAL

## 2018-03-27 ASSESSMENT — SLIT LAMP EXAM - LIDS: COMMENTS: NORMAL

## 2018-03-27 ASSESSMENT — VISUAL ACUITY
OS_SC: 20/30
METHOD: SNELLEN - LINEAR

## 2018-03-27 NOTE — PATIENT INSTRUCTIONS
1) Continue all drops three times daily until gone.    2)  Stop using shield after one week one week after surgery.    3)  Return for final exam as scheduled in about one month.    Mark Miller M.D.

## 2018-03-27 NOTE — LETTER
3/27/2018         RE: Navneet Toussaint  [address]  [place] [address]        Dear Colleague,    Thank you for referring your patient, Navneet Toussaint, to the [facility]. Please see a copy of my visit note below.     Current Eye Medications:  Prednisolone and Voltaren three times a day left eye     Subjective:  Here for PO2 left eye, no pain or redness. Vision is improving.      Objective:  See Ophthalmology Exam.       Assessment: Doing well status/post Kelman phacoemulsification/ posterior chamber lens left eye.      Plan:   See Patient Instructions.         Again, thank you for allowing me to participate in the care of your patient.        Sincerely,        Mark Miller MD

## 2018-03-27 NOTE — PROGRESS NOTES
Current Eye Medications:  Prednisolone and Voltaren three times a day left eye     Subjective:  Here for PO2 left eye, no pain or redness. Vision is improving.      Objective:  See Ophthalmology Exam.       Assessment: Doing well status/post Kelman phacoemulsification/ posterior chamber lens left eye.      Plan:   See Patient Instructions.

## 2018-03-27 NOTE — MR AVS SNAPSHOT
After Visit Summary   3/27/2018    Navneet Toussaint    MRN: [id number]           Patient Information     Date Of Birth          1955        Visit Information        Provider Department      3/27/2018 12:45 PM Mark Miller MD [facility]        Today's Diagnoses     Pseudophakia, ou    -  1      Care Instructions    1) Continue all drops three times daily until gone.    2)  Stop using shield after one week one week after surgery.    3)  Return for final exam as scheduled in about one month.    Mark Miller M.D.          Follow-ups after your visit        Your next 10 appointments already scheduled     Apr 27, 2018  8:45 AM CDT   Return Visit with Mark Miller MD   [facility] ([facility])    [address] [facility] [address] [phone]              Who to contact     If you have questions or need follow up information about today's clinic visit or your schedule please contact [facility] directly at [phone].  Normal or non-critical lab and imaging results will be communicated to you by [employer], letter or phone within 4 business days after the clinic has received the results. If you do not hear from us within 7 days, please contact the clinic through [employer] or phone. If you have a critical or abnormal lab result, we will notify you by phone as soon as possible.  Submit refill requests through [employer] or call your pharmacy and they will forward the refill request to us. Please allow 3 business days for your refill to be completed.          Additional Information About Your Visit        [employer] Information     [employer] gives you secure access to your electronic health record. If you see a primary care provider, you can also send messages to your care team and make appointments. If you have questions, please call your primary care clinic.  If you do not have a primary care provider, please call [phone] and they will  assist you.        Care EveryWhere ID     This is your Care EveryWhere ID. This could be used by other organizations to access your [facility] medical records  [id number]         Blood Pressure from Last 3 Encounters:   03/13/18 116/62   11/14/17 121/64   06/20/17 115/64    Weight from Last 3 Encounters:   03/13/18 93.4 kg (206 lb)   11/14/17 91.2 kg (201 lb)   06/20/17 88 kg (194 lb)              Today, you had the following     No orders found for display       Primary Care Provider Office Phone # Fax #    [facility] [phone] [address] [facility] [address]        Equal Access to Services     JANICE SONG : Hadii pepito hamo S[place]oko, waaxda luqadaha, qaybta kaalmada adeegyada, cory ospina . So [facility] [phone].    ATENCIÓN: Si habla español, tiene a florian disposición servicios gratuitos de asistencia lingüística. [facility] [phone].    We comply with applicable federal civil rights laws and [place] laws. We do not discriminate on the basis of race, color, national origin, age, disability, sex, sexual orientation, or gender identity.            Thank you!     Thank you for choosing [facility]  for your care. Our goal is always to provide you with excellent care. Hearing back from our patients is one way we can continue to improve our services. Please take a few minutes to complete the written survey that you may receive in the mail after your visit with us. Thank you!             Your Updated Medication List - Protect others around you: Learn how to safely use, store and throw away your medicines at www.disposemymeds.org.          This list is accurate as of 3/27/18  1:09 PM.  Always use your most recent med list.                   Brand Name Dispense Instructions for use Diagnosis    CIALIS 20 MG tablet   Generic drug:  tadalafil     10 tablet    TAKE 1 TABLET (20 MG) BY MOUTH DAILY AS NEEDED FOR ERECTILE DYSFUNCTION     Erectile dysfunction, unspecified erectile dysfunction type       diclofenac 0.1 % ophthalmic solution    VOLTAREN    5 mL    Place 1 drop Into the left eye 3 times daily    Combined forms of age-related cataract of left eye       finasteride 1 MG tablet    PROPECIA    90 tablet    TAKE 1 TABLET BY MOUTH ONCE DAILY    Male pattern baldness       fluticasone 50 MCG/ACT spray    FLONASE    16 g    Spray 2 sprays into both nostrils daily    Seasonal allergic rhinitis, unspecified allergic rhinitis trigger       gabapentin 800 MG tablet    NEURONTIN    270 tablet    TAKE 1 TABLET BY MOUTH 3 TIMES DAILY    Idiopathic peripheral neuropathy       levothyroxine 112 MCG tablet    SYNTHROID/LEVOTHROID    90 tablet    Take 1 tablet (112 mcg) by mouth daily    Hypothyroidism, unspecified type       omeprazole 40 MG capsule    priLOSEC    30 capsule    Take 1 capsule (40 mg) by mouth daily Take 30-60 minutes before a meal.    Gastroesophageal reflux disease without esophagitis       prednisoLONE acetate 1 % ophthalmic susp    PRED FORTE    5 mL    Place 1 drop Into the left eye 3 times daily    Combined forms of age-related cataract of left eye       tamsulosin 0.4 MG capsule    FLOMAX    90 capsule    TAKE 1 CAPSULE (0.4 MG) BY MOUTH DAILY    Benign non-nodular prostatic hyperplasia with lower urinary tract symptoms       traMADol 50 MG tablet    ULTRAM    270 tablet    TAKE 1 TO 2 TABLETS BY MOUTH EVERY 4 HOURS AS NEEDED .MAX 8 TABS IN A DAY    Idiopathic neuropathy, Chronic pain syndrome

## 2018-04-27 ENCOUNTER — OFFICE VISIT (OUTPATIENT)
Dept: OPHTHALMOLOGY | Facility: CLINIC | Age: 63
End: 2018-04-27
Payer: COMMERCIAL

## 2018-04-27 DIAGNOSIS — Z96.1 PSEUDOPHAKIA: Primary | ICD-10-CM

## 2018-04-27 PROCEDURE — 99024 POSTOP FOLLOW-UP VISIT: CPT | Performed by: OPHTHALMOLOGY

## 2018-04-27 ASSESSMENT — VISUAL ACUITY
METHOD: SNELLEN - LINEAR
OS_SC: 20/25
OD_SC: 20/20

## 2018-04-27 ASSESSMENT — EXTERNAL EXAM - RIGHT EYE: OD_EXAM: 1+ BROW PTOSIS

## 2018-04-27 ASSESSMENT — REFRACTION_MANIFEST
OS_CYLINDER: +1.25
OD_AXIS: 010
OS_AXIS: 178
OD_SPHERE: -0.50
OD_CYLINDER: +0.75
OS_ADD: +2.50
OD_ADD: +2.50
OS_SPHERE: -1.00

## 2018-04-27 ASSESSMENT — SLIT LAMP EXAM - LIDS
COMMENTS: 1+ DERMATOCHALASIS - UPPER LID
COMMENTS: 1+ DERMATOCHALASIS - UPPER LID

## 2018-04-27 ASSESSMENT — TONOMETRY
OS_IOP_MMHG: 15
IOP_METHOD: APPLANATION

## 2018-04-27 ASSESSMENT — EXTERNAL EXAM - LEFT EYE: OS_EXAM: NORMAL

## 2018-04-27 ASSESSMENT — CUP TO DISC RATIO: OS_RATIO: 0.2

## 2018-04-27 NOTE — MR AVS SNAPSHOT
After Visit Summary   4/27/2018    Navneet Toussaint    MRN: [id number]           Patient Information     Date Of Birth          1955        Visit Information        Provider Department      4/27/2018 8:45 AM Mark Mliler MD [facility]        Care Instructions    1)  Discontinue all drops, unless used prior to cataract surgery.    2)   Fill Rx for new glasses or drugstore readers.    3)   Return to clinic in three months for a pressure check or earlier if problems should arise.     Mark Miller M.D.               Follow-ups after your visit        Who to contact     If you have questions or need follow up information about today's clinic visit or your schedule please contact [facility] directly at [phone].  Normal or non-critical lab and imaging results will be communicated to you by MyChart, letter or phone within 4 business days after the clinic has received the results. If you do not hear from us within 7 days, please contact the clinic through CQuotienthart or phone. If you have a critical or abnormal lab result, we will notify you by phone as soon as possible.  Submit refill requests through [employer] or call your pharmacy and they will forward the refill request to us. Please allow 3 business days for your refill to be completed.          Additional Information About Your Visit        MyChart Information     [employer] gives you secure access to your electronic health record. If you see a primary care provider, you can also send messages to your care team and make appointments. If you have questions, please call your primary care clinic.  If you do not have a primary care provider, please call [phone] and they will assist you.        Care EveryWhere ID     This is your Care EveryWhere ID. This could be used by other organizations to access your [facility] medical records  [id number]         Blood Pressure from Last 3 Encounters:   03/13/18 116/62   11/14/17  121/64   06/20/17 115/64    Weight from Last 3 Encounters:   03/13/18 93.4 kg (206 lb)   11/14/17 91.2 kg (201 lb)   06/20/17 88 kg (194 lb)              Today, you had the following     No orders found for display       Primary Care Provider Office Phone # Fax #    [facility] [phone] [address] [facility] [address]        Equal Access to Services     BRIANNA SONG : Hadii aad ku hadasho Soomaali, waaxda luqadaha, qaybta kaalmada adeegyada, waxay idiin hayaan adeeg kharash labaljit . So [facility] [phone].    ATENCIÓN: Si habla español, tiene a florian disposición servicios gratuitos de asistencia lingüística. [facility] [phone].    We comply with applicable federal civil rights laws and [place] laws. We do not discriminate on the basis of race, color, national origin, age, disability, sex, sexual orientation, or gender identity.            Thank you!     Thank you for choosing [facility]  for your care. Our goal is always to provide you with excellent care. Hearing back from our patients is one way we can continue to improve our services. Please take a few minutes to complete the written survey that you may receive in the mail after your visit with us. Thank you!             Your Updated Medication List - Protect others around you: Learn how to safely use, store and throw away your medicines at www.disposemymeds.org.          This list is accurate as of 4/27/18  9:49 AM.  Always use your most recent med list.                   Brand Name Dispense Instructions for use Diagnosis    CIALIS 20 MG tablet   Generic drug:  tadalafil     10 tablet    TAKE 1 TABLET (20 MG) BY MOUTH DAILY AS NEEDED FOR ERECTILE DYSFUNCTION    Erectile dysfunction, unspecified erectile dysfunction type       diclofenac 0.1 % ophthalmic solution    VOLTAREN    5 mL    Place 1 drop Into the left eye 3 times daily    Combined forms of age-related cataract of left eye       finasteride 1 MG  tablet    PROPECIA    90 tablet    TAKE 1 TABLET BY MOUTH ONCE DAILY    Male pattern baldness       fluticasone 50 MCG/ACT spray    FLONASE    16 g    Spray 2 sprays into both nostrils daily    Seasonal allergic rhinitis, unspecified allergic rhinitis trigger       gabapentin 800 MG tablet    NEURONTIN    270 tablet    TAKE 1 TABLET BY MOUTH 3 TIMES DAILY    Idiopathic peripheral neuropathy       levothyroxine 112 MCG tablet    SYNTHROID/LEVOTHROID    90 tablet    Take 1 tablet (112 mcg) by mouth daily    Hypothyroidism, unspecified type       omeprazole 40 MG capsule    priLOSEC    30 capsule    Take 1 capsule (40 mg) by mouth daily Take 30-60 minutes before a meal.    Gastroesophageal reflux disease without esophagitis       prednisoLONE acetate 1 % ophthalmic susp    PRED FORTE    5 mL    Place 1 drop Into the left eye 3 times daily    Combined forms of age-related cataract of left eye       tamsulosin 0.4 MG capsule    FLOMAX    90 capsule    TAKE 1 CAPSULE (0.4 MG) BY MOUTH DAILY    Benign non-nodular prostatic hyperplasia with lower urinary tract symptoms       traMADol 50 MG tablet    ULTRAM    270 tablet    TAKE 1 TO 2 TABLETS BY MOUTH EVERY 4 HOURS AS NEEDED .MAX 8 TABS IN A DAY    Idiopathic neuropathy, Chronic pain syndrome

## 2018-04-27 NOTE — LETTER
4/27/2018         RE: Navneet Toussaint  [address]  [place] [address]        Dear Colleague,    Thank you for referring your patient, Navneet Toussaint, to the [facility]. Please see a copy of my visit note below.     Current Eye Medications:  Voltaren tid left eye, Pred tid left eye      Subjective:  Final MR left eye   Patient feels that he is doing well.  He does feel that the vision is not quite as acute as the right eye.     Objective:  See Ophthalmology Exam.       Assessment: Doing well status/post Kelman phacoemulsification/ posterior chamber lens left eye.      Plan:   See Patient Instructions.         Again, thank you for allowing me to participate in the care of your patient.        Sincerely,        Mark Miller MD

## 2018-04-27 NOTE — PROGRESS NOTES
Current Eye Medications:  Voltaren tid left eye, Pred tid left eye      Subjective:  Final MR left eye   Patient feels that he is doing well.  He does feel that the vision is not quite as acute as the right eye.     Objective:  See Ophthalmology Exam.       Assessment: Doing well status/post Kelman phacoemulsification/ posterior chamber lens left eye.      Plan:   See Patient Instructions.

## 2018-04-27 NOTE — PATIENT INSTRUCTIONS
1)  Discontinue all drops, unless used prior to cataract surgery.    2)   Fill Rx for new glasses or drugstore readers.    3)   Return to clinic in three months for a pressure check or earlier if problems should arise.     Mark Miller M.D.

## 2018-05-06 DIAGNOSIS — G89.4 CHRONIC PAIN SYNDROME: ICD-10-CM

## 2018-05-06 DIAGNOSIS — N52.9 ERECTILE DYSFUNCTION, UNSPECIFIED ERECTILE DYSFUNCTION TYPE: ICD-10-CM

## 2018-05-06 DIAGNOSIS — G60.9 IDIOPATHIC PERIPHERAL NEUROPATHY: ICD-10-CM

## 2018-05-06 DIAGNOSIS — N40.1 BENIGN NON-NODULAR PROSTATIC HYPERPLASIA WITH LOWER URINARY TRACT SYMPTOMS: ICD-10-CM

## 2018-05-06 DIAGNOSIS — G60.9 IDIOPATHIC NEUROPATHY: ICD-10-CM

## 2018-05-08 NOTE — TELEPHONE ENCOUNTER
"Requested Prescriptions   Pending Prescriptions Disp Refills     tamsulosin (FLOMAX) 0.4 MG capsule [Pharmacy Med Name: TAMSULOSIN HCL 0.4 MG CAPSULE] 90 capsule 3     Sig: TAKE 1 CAPSULE (0.4 MG) BY MOUTH DAILY    Alpha Blockers Failed    5/6/2018  8:47 PM       Failed - Patient does not have Tadalafil, Vardenafil, or Sildenafil on their medication list       Passed - Blood pressure under 140/90 in past 12 months    BP Readings from Last 3 Encounters:   03/13/18 116/62   11/14/17 121/64   06/20/17 115/64                Passed - Recent (12 mo) or future (30 days) visit within the authorizing provider's specialty    Patient had office visit in the last 12 months or has a visit in the next 30 days with authorizing provider or within the authorizing provider's specialty.  See \"Patient Info\" tab in inbasket, or \"Choose Columns\" in Meds & Orders section of the refill encounter.           Passed - Patient is 18 years of age or older        CIALIS 20 MG tablet [Pharmacy Med Name: CIALIS 20 MG TABLET] 10 tablet 3     Sig: TAKE 1 TABLET (20 MG) BY MOUTH DAILY AS NEEDED FOR ERECTILE DYSFUNCTION    Erectile Dysfuction Protocol Failed    5/6/2018  8:47 PM       Failed - Absence of Alpha Blockers on Med list       Passed - Absence of nitrates on medication list       Passed - Recent (12 mo) or future (30 days) visit within the authorizing provider's specialty    Patient had office visit in the last 12 months or has a visit in the next 30 days with authorizing provider or within the authorizing provider's specialty.  See \"Patient Info\" tab in inbasket, or \"Choose Columns\" in Meds & Orders section of the refill encounter.           Passed - Patient is age 18 or older        gabapentin (NEURONTIN) 800 MG tablet [Pharmacy Med Name: GABAPENTIN 800 MG TABLET] 270 tablet 3     Sig: TAKE 1 TABLET BY MOUTH 3 TIMES DAILY    There is no refill protocol information for this order        traMADol (ULTRAM) 50 MG tablet [Pharmacy Med Name: " TRAMADOL HCL 50 MG TABLET] 270 tablet 0     Sig: TAKE 1 TO 2 TABLETS BY MOUTH EVERY 4 HOURS AS NEEDED .MAX 8 TABS IN A DAY    There is no refill protocol information for this order        To provider to approve as failed protocol for RN.  DUe for office visit. Last office visit with pcp 5/23/17.    Controlled Substance Refill Request for Tramadol and Gabapentin  Problem List Complete:  Yes  Patient is followed by SHWETA WARNER for ongoing prescription of pain medication.  All refills should be approved by this provider, or covering partner.    Medication(s): tramadol .   Maximum quantity per month: 90  Clinic visit frequency required: Q 6  months     Controlled substance agreement on file: Yes 11/30/2016      Pain Clinic evaluation in the past: Yes in 2011 with Dr Madison     checked in past 6 months?  Yes 1/9/18

## 2018-05-09 RX ORDER — TAMSULOSIN HYDROCHLORIDE 0.4 MG/1
CAPSULE ORAL
Qty: 90 CAPSULE | Refills: 3 | Status: SHIPPED | OUTPATIENT
Start: 2018-05-09 | End: 2019-06-08

## 2018-05-09 RX ORDER — TRAMADOL HYDROCHLORIDE 50 MG/1
TABLET ORAL
Qty: 270 TABLET | Refills: 0 | Status: SHIPPED | OUTPATIENT
Start: 2018-05-09 | End: 2018-07-19

## 2018-05-09 RX ORDER — TADALAFIL 20 MG
TABLET ORAL
Qty: 10 TABLET | Refills: 3 | Status: SHIPPED | OUTPATIENT
Start: 2018-05-09 | End: 2018-07-19

## 2018-05-09 RX ORDER — GABAPENTIN 800 MG/1
TABLET ORAL
Qty: 270 TABLET | Refills: 3 | Status: SHIPPED | OUTPATIENT
Start: 2018-05-09 | End: 2019-05-14

## 2018-05-22 ENCOUNTER — OFFICE VISIT (OUTPATIENT)
Dept: URGENT CARE | Facility: URGENT CARE | Age: 63
End: 2018-05-22
Payer: COMMERCIAL

## 2018-05-22 VITALS
RESPIRATION RATE: 14 BRPM | BODY MASS INDEX: 27.15 KG/M2 | OXYGEN SATURATION: 97 % | HEART RATE: 71 BPM | TEMPERATURE: 98.9 F | DIASTOLIC BLOOD PRESSURE: 84 MMHG | WEIGHT: 200.2 LBS | SYSTOLIC BLOOD PRESSURE: 133 MMHG

## 2018-05-22 DIAGNOSIS — J30.2 ACUTE SEASONAL ALLERGIC RHINITIS DUE TO OTHER ALLERGEN: Primary | ICD-10-CM

## 2018-05-22 PROCEDURE — 99213 OFFICE O/P EST LOW 20 MIN: CPT | Mod: 24 | Performed by: NURSE PRACTITIONER

## 2018-05-22 RX ORDER — CETIRIZINE HYDROCHLORIDE 10 MG/1
10 TABLET ORAL EVERY EVENING
Qty: 14 TABLET | Refills: 0 | Status: SHIPPED | OUTPATIENT
Start: 2018-05-22 | End: 2018-06-05

## 2018-05-22 ASSESSMENT — ENCOUNTER SYMPTOMS
SHORTNESS OF BREATH: 0
DIAPHORESIS: 0
RHINORRHEA: 1
NAUSEA: 0
DIARRHEA: 0
FEVER: 0
COUGH: 1
VOMITING: 0
SORE THROAT: 0
CHILLS: 0

## 2018-05-22 NOTE — PROGRESS NOTES
SUBJECTIVE:   Navneet Toussaint is a 62 year old male presenting with a chief complaint of   Chief Complaint   Patient presents with     URI     Patient complains of cough and congestion. x 2 days       He is an established patient of [facility].    URI Adult    Onset of symptoms was 2 day(s) ago.  Course of illness is worsening.    Severity moderate  Current and Associated symptoms: runny nose, stuffy nose, cough - non-productive and sneezing  Treatment measures tried include None tried.  Predisposing factors include None.      Review of Systems   Constitutional: Negative for chills, diaphoresis and fever.   HENT: Positive for congestion, rhinorrhea and sneezing. Negative for ear pain and sore throat.    Respiratory: Positive for cough. Negative for shortness of breath.    Gastrointestinal: Negative for diarrhea, nausea and vomiting.       Past Medical History:   Diagnosis Date     Cataract, mod, od; mild-mod, os 2/24/2015     Hypothyroid      Prostate cancer (H), adenocarcinoma 2/23/2018     Sleep apnea     doesnt use CPAP machine      Social anxiety disorder      Family History   Problem Relation Age of Onset     Respiratory Father      HEART DISEASE Brother      Mi at age 52 passed away     Alzheimer Disease Mother      dementia     DIABETES Mother      CANCER No family hx of      Hypertension No family hx of      CEREBROVASCULAR DISEASE No family hx of      Thyroid Disease No family hx of      Glaucoma No family hx of      Macular Degeneration No family hx of      Current Outpatient Prescriptions   Medication Sig Dispense Refill     budesonide (RHINOCORT AQUA) 32 MCG/ACT spray Spray 2 sprays into both nostrils daily for 7 days 1 Bottle 0     cetirizine (ZYRTEC) 10 MG tablet Take 1 tablet (10 mg) by mouth every evening for 14 days 14 tablet 0     CIALIS 20 MG tablet TAKE 1 TABLET (20 MG) BY MOUTH DAILY AS NEEDED FOR ERECTILE DYSFUNCTION 10 tablet 3     finasteride (PROPECIA) 1 MG tablet TAKE 1 TABLET BY MOUTH ONCE  DAILY 90 tablet 0     fluticasone (FLONASE) 50 MCG/ACT nasal spray Spray 2 sprays into both nostrils daily 16 g 6     gabapentin (NEURONTIN) 800 MG tablet TAKE 1 TABLET BY MOUTH 3 TIMES DAILY 270 tablet 3     levothyroxine (SYNTHROID/LEVOTHROID) 112 MCG tablet Take 1 tablet (112 mcg) by mouth daily 90 tablet 2     omeprazole (PRILOSEC) 40 MG capsule Take 1 capsule (40 mg) by mouth daily Take 30-60 minutes before a meal. 30 capsule 4     tamsulosin (FLOMAX) 0.4 MG capsule TAKE 1 CAPSULE (0.4 MG) BY MOUTH DAILY 90 capsule 3     traMADol (ULTRAM) 50 MG tablet TAKE 1 TO 2 TABLETS BY MOUTH EVERY 4 HOURS AS NEEDED .MAX 8 TABS IN A  tablet 0     diclofenac (VOLTAREN) 0.1 % ophthalmic solution Place 1 drop Into the left eye 3 times daily (Patient not taking: Reported on 5/22/2018) 5 mL 0     prednisoLONE acetate (PRED FORTE) 1 % ophthalmic susp Place 1 drop Into the left eye 3 times daily (Patient not taking: Reported on 5/22/2018) 5 mL 0     Social History   Substance Use Topics     Smoking status: Former Smoker     Types: Cigarettes     Quit date: 10/1/1979     Smokeless tobacco: Never Used      Comment: Lives in smokefree house     Alcohol use No      Comment: weekly       OBJECTIVE  /84  Pulse 71  Temp 98.9  F (37.2  C) (Oral)  Resp 14  Wt 200 lb 3.2 oz (90.8 kg)  SpO2 97%  BMI 27.15 kg/m2    Physical Exam   Constitutional: He appears well-developed and well-nourished. No distress.   HENT:   Head: Normocephalic and atraumatic.   Right Ear: Tympanic membrane and external ear normal.   Left Ear: Tympanic membrane and external ear normal.   Nose: Mucosal edema and rhinorrhea present.   Mouth/Throat: Oropharynx is clear and moist.   Eyes: EOM are normal. Pupils are equal, round, and reactive to light.   Neck: Normal range of motion. Neck supple.   Pulmonary/Chest: Effort normal and breath sounds normal. No respiratory distress.   Lymphadenopathy:     He has no cervical adenopathy.   Neurological: He is  alert. No cranial nerve deficit.   Skin: Skin is warm and dry. He is not diaphoretic.   Psychiatric: He has a normal mood and affect.   Nursing note and vitals reviewed.      ASSESSMENT:      ICD-10-CM    1. Acute seasonal allergic rhinitis due to other allergen J30.2 budesonide (RHINOCORT AQUA) 32 MCG/ACT spray     cetirizine (ZYRTEC) 10 MG tablet        Medical Decision Making:    Differential Diagnosis:  URI Adult/Peds:  Influenza, Pneumonia, Sinusitis and Viral upper respiratory illness    Serious Comorbid Conditions:  Adult:  None    PLAN:    URI Adult:  Fluids, Rest, OTC decongestant/antihistamine and Saline nasal spray    Followup:    If not improving or if condition worsens, follow up with your Primary Care Provider    Patient Instructions     Allergic Rhinitis  Allergic rhinitis is an allergic reaction that affects the nose, and often the eyes. It s often known as nasal allergies. Nasal allergies are often due to things in the environment that are breathed in. Depending what you are sensitive to, nasal allergies may occur only during certain seasons. Or they may occur year round. Common indoor allergens include house dust mites, mold, cockroaches, and pet dander. Outdoor allergens include pollen from trees, grasses, and weeds.   Symptoms include a drippy, stuffy, and itchy nose. They also include sneezing and red and itchy eyes. You may feel tired more often. Severe allergies may also affect your breathing and trigger a condition called asthma.   Tests can be done to see what allergens are affecting you. You may be referred to an allergy specialist for testing and further evaluation.  Home care  Your healthcare provider may prescribe medicines to help relieve allergy symptoms. These may include oral medicines, nasal sprays, or eye drops.  Ask your provider for advice on how to avoid substances that you are allergic to. Below are a few tips for each type of allergen.  Pet dander:    Do not have pets with fur  and feathers.    If you can't avoid having a pet, keep it out of your bedroom and off upholstered furniture.  Pollen:    When pollen counts are high, keep windows of your car and home closed. If possible, use an air conditioner instead.    Wear a filter mask when mowing or doing yard work.  House dust mites:    Wash bedding every week in warm water and detergent and dry on a hot setting.    Cover the mattress, box spring, and pillows with allergy covers.     If possible, sleep in a room with no carpet, curtains, or upholstered furniture.  Cockroaches:    Store food in sealed containers.    Remove garbage from the home promptly.    Fix water leaks  Mold:    Keep humidity low by using a dehumidifier or air conditioner. Keep the dehumidifier and air conditioner clean and free of mold.    Clean moldy areas with bleach and water.  In general:    Vacuum once or twice a week. If possible, use a vacuum with a high-efficiency particulate air (HEPA) filter.    Do not smoke. Avoid cigarette smoke. Cigarette smoke is an irritant that can make symptoms worse.  Follow-up care  Follow up as advised by the healthcare provider or our staff. If you were referred to an allergy specialist, make this appointment promptly.  When to seek medical advice  Call your healthcare provider right away if the following occur:    Coughing or wheezing    Fever of 100.4 F (38 C) or higher, or as directed by your healthcare provider    Raised red bumps (hives)    Continuing symptoms, new symptoms, or worsening symptoms  Call 911 if you have:    Trouble breathing    Severe swelling of the face or severe itching of the eyes or mouth  Date Last Reviewed: 3/1/2017    5293-6395 [employer]. [address] [place] [address], [place] [address]. All rights reserved. This information is not intended as a substitute for professional medical care. Always follow your healthcare professional's instructions.

## 2018-05-22 NOTE — MR AVS SNAPSHOT
After Visit Summary   5/22/2018    Navneet Toussaint    MRN: [id number]           Patient Information     Date Of Birth          1955        Visit Information        Provider Department      5/22/2018 11:45 AM Amanda Mohan NP [facility]        Today's Diagnoses     Acute seasonal allergic rhinitis due to other allergen    -  1      Care Instructions      Allergic Rhinitis  Allergic rhinitis is an allergic reaction that affects the nose, and often the eyes. It s often known as nasal allergies. Nasal allergies are often due to things in the environment that are breathed in. Depending what you are sensitive to, nasal allergies may occur only during certain seasons. Or they may occur year round. Common indoor allergens include house dust mites, mold, cockroaches, and pet dander. Outdoor allergens include pollen from trees, grasses, and weeds.   Symptoms include a drippy, stuffy, and itchy nose. They also include sneezing and red and itchy eyes. You may feel tired more often. Severe allergies may also affect your breathing and trigger a condition called asthma.   Tests can be done to see what allergens are affecting you. You may be referred to an allergy specialist for testing and further evaluation.  Home care  Your healthcare provider may prescribe medicines to help relieve allergy symptoms. These may include oral medicines, nasal sprays, or eye drops.  Ask your provider for advice on how to avoid substances that you are allergic to. Below are a few tips for each type of allergen.  Pet dander:    Do not have pets with fur and feathers.    If you can't avoid having a pet, keep it out of your bedroom and off upholstered furniture.  Pollen:    When pollen counts are high, keep windows of your car and home closed. If possible, use an air conditioner instead.    Wear a filter mask when mowing or doing yard work.  House dust mites:    Wash bedding every week in warm water and detergent and  dry on a hot setting.    Cover the mattress, box spring, and pillows with allergy covers.     If possible, sleep in a room with no carpet, curtains, or upholstered furniture.  Cockroaches:    Store food in sealed containers.    Remove garbage from the home promptly.    Fix water leaks  Mold:    Keep humidity low by using a dehumidifier or air conditioner. Keep the dehumidifier and air conditioner clean and free of mold.    Clean moldy areas with bleach and water.  In general:    Vacuum once or twice a week. If possible, use a vacuum with a high-efficiency particulate air (HEPA) filter.    Do not smoke. Avoid cigarette smoke. Cigarette smoke is an irritant that can make symptoms worse.  Follow-up care  Follow up as advised by the healthcare provider or our staff. If you were referred to an allergy specialist, make this appointment promptly.  When to seek medical advice  Call your healthcare provider right away if the following occur:    Coughing or wheezing    Fever of 100.4 F (38 C) or higher, or as directed by your healthcare provider    Raised red bumps (hives)    Continuing symptoms, new symptoms, or worsening symptoms  Call 911 if you have:    Trouble breathing    Severe swelling of the face or severe itching of the eyes or mouth  Date Last Reviewed: 3/1/2017    9265-7271 [employer]. [address] [place] [address]. All rights reserved. This information is not intended as a substitute for professional medical care. Always follow your healthcare professional's instructions.                Follow-ups after your visit        Your next 10 appointments already scheduled     Jul 12, 2018  4:00 PM CDT   Return Visit with Mark Miller MD   [facility] [place] ([facility])    [address] [place] [address]  [place] [address] [phone]              Who to contact     If you have questions or need follow up information about today's clinic visit or your schedule please  contact [facility] directly at [phone].  Normal or non-critical lab and imaging results will be communicated to you by MyChart, letter or phone within 4 business days after the clinic has received the results. If you do not hear from us within 7 days, please contact the clinic through [employer] or phone. If you have a critical or abnormal lab result, we will notify you by phone as soon as possible.  Submit refill requests through [employer] or call your pharmacy and they will forward the refill request to us. Please allow 3 business days for your refill to be completed.          Additional Information About Your Visit        [employer] Information     [employer] gives you secure access to your electronic health record. If you see a primary care provider, you can also send messages to your care team and make appointments. If you have questions, please call your primary care clinic.  If you do not have a primary care provider, please call [phone] and they will assist you.        Care EveryWhere ID     This is your Care EveryWhere ID. This could be used by other organizations to access your [facility] medical records  [id number]        Your Vitals Were     Pulse Temperature Respirations Pulse Oximetry BMI (Body Mass Index)       71 98.9  F (37.2  C) (Oral) 14 97% 27.15 kg/m2        Blood Pressure from Last 3 Encounters:   05/22/18 133/84   03/13/18 116/62   11/14/17 121/64    Weight from Last 3 Encounters:   05/22/18 200 lb 3.2 oz (90.8 kg)   03/13/18 206 lb (93.4 kg)   11/14/17 201 lb (91.2 kg)              Today, you had the following     No orders found for display         Today's Medication Changes          These changes are accurate as of 5/22/18 12:49 PM.  If you have any questions, ask your nurse or doctor.               Start taking these medicines.        Dose/Directions    budesonide 32 MCG/ACT spray   Commonly known as:  RHINOCORT AQUA   Used for:  Acute seasonal allergic rhinitis due to  other allergen   Started by:  Amanda Mohan NP        Dose:  2 spray   Spray 2 sprays into both nostrils daily for 7 days   Quantity:  1 Bottle   Refills:  0       cetirizine 10 MG tablet   Commonly known as:  zyrTEC   Used for:  Acute seasonal allergic rhinitis due to other allergen   Started by:  Amanda Mohan NP        Dose:  10 mg   Take 1 tablet (10 mg) by mouth every evening for 14 days   Quantity:  14 tablet   Refills:  0            Where to get your medicines      These medications were sent to [facility]/pharmacy #[id number] - [place], [place] - [address] [facility]  [address], [place] [address]     Phone:  [phone]     budesonide 32 MCG/ACT spray    cetirizine 10 MG tablet                Primary Care Provider Office Phone # Fax #    [place] [phone] [address] [facility] [address]        Equal Access to Services     [facility] AH: Hadii pepito lei hadasho Sokoko, waaxda luqadaha, qaybta kaalmada adeegyada, cory ospina . So [facility] [phone].    ATENCIÓN: Si gail veloz, tiene a florian disposición servicios gratuitos de asistencia lingüística. [facility] [phone].    We comply with applicable federal civil rights laws and [place] laws. We do not discriminate on the basis of race, color, national origin, age, disability, sex, sexual orientation, or gender identity.            Thank you!     Thank you for choosing [facility]  for your care. Our goal is always to provide you with excellent care. Hearing back from our patients is one way we can continue to improve our services. Please take a few minutes to complete the written survey that you may receive in the mail after your visit with us. Thank you!             Your Updated Medication List - Protect others around you: Learn how to safely use, store and throw away your medicines at www.disposemymeds.org.          This list is accurate as  of 5/22/18 12:49 PM.  Always use your most recent med list.                   Brand Name Dispense Instructions for use Diagnosis    budesonide 32 MCG/ACT spray    RHINOCORT AQUA    1 Bottle    Spray 2 sprays into both nostrils daily for 7 days    Acute seasonal allergic rhinitis due to other allergen       cetirizine 10 MG tablet    zyrTEC    14 tablet    Take 1 tablet (10 mg) by mouth every evening for 14 days    Acute seasonal allergic rhinitis due to other allergen       CIALIS 20 MG tablet   Generic drug:  tadalafil     10 tablet    TAKE 1 TABLET (20 MG) BY MOUTH DAILY AS NEEDED FOR ERECTILE DYSFUNCTION    Erectile dysfunction, unspecified erectile dysfunction type       diclofenac 0.1 % ophthalmic solution    VOLTAREN    5 mL    Place 1 drop Into the left eye 3 times daily    Combined forms of age-related cataract of left eye       finasteride 1 MG tablet    PROPECIA    90 tablet    TAKE 1 TABLET BY MOUTH ONCE DAILY    Male pattern baldness       fluticasone 50 MCG/ACT spray    FLONASE    16 g    Spray 2 sprays into both nostrils daily    Seasonal allergic rhinitis, unspecified allergic rhinitis trigger       gabapentin 800 MG tablet    NEURONTIN    270 tablet    TAKE 1 TABLET BY MOUTH 3 TIMES DAILY    Idiopathic peripheral neuropathy       levothyroxine 112 MCG tablet    SYNTHROID/LEVOTHROID    90 tablet    Take 1 tablet (112 mcg) by mouth daily    Hypothyroidism, unspecified type       omeprazole 40 MG capsule    priLOSEC    30 capsule    Take 1 capsule (40 mg) by mouth daily Take 30-60 minutes before a meal.    Gastroesophageal reflux disease without esophagitis       prednisoLONE acetate 1 % ophthalmic susp    PRED FORTE    5 mL    Place 1 drop Into the left eye 3 times daily    Combined forms of age-related cataract of left eye       tamsulosin 0.4 MG capsule    FLOMAX    90 capsule    TAKE 1 CAPSULE (0.4 MG) BY MOUTH DAILY    Benign non-nodular prostatic hyperplasia with lower urinary tract symptoms        traMADol 50 MG tablet    ULTRAM    270 tablet    TAKE 1 TO 2 TABLETS BY MOUTH EVERY 4 HOURS AS NEEDED .MAX 8 TABS IN A DAY    Idiopathic neuropathy, Chronic pain syndrome

## 2018-05-22 NOTE — PATIENT INSTRUCTIONS
Allergic Rhinitis  Allergic rhinitis is an allergic reaction that affects the nose, and often the eyes. It s often known as nasal allergies. Nasal allergies are often due to things in the environment that are breathed in. Depending what you are sensitive to, nasal allergies may occur only during certain seasons. Or they may occur year round. Common indoor allergens include house dust mites, mold, cockroaches, and pet dander. Outdoor allergens include pollen from trees, grasses, and weeds.   Symptoms include a drippy, stuffy, and itchy nose. They also include sneezing and red and itchy eyes. You may feel tired more often. Severe allergies may also affect your breathing and trigger a condition called asthma.   Tests can be done to see what allergens are affecting you. You may be referred to an allergy specialist for testing and further evaluation.  Home care  Your healthcare provider may prescribe medicines to help relieve allergy symptoms. These may include oral medicines, nasal sprays, or eye drops.  Ask your provider for advice on how to avoid substances that you are allergic to. Below are a few tips for each type of allergen.  Pet dander:    Do not have pets with fur and feathers.    If you can't avoid having a pet, keep it out of your bedroom and off upholstered furniture.  Pollen:    When pollen counts are high, keep windows of your car and home closed. If possible, use an air conditioner instead.    Wear a filter mask when mowing or doing yard work.  House dust mites:    Wash bedding every week in warm water and detergent and dry on a hot setting.    Cover the mattress, box spring, and pillows with allergy covers.     If possible, sleep in a room with no carpet, curtains, or upholstered furniture.  Cockroaches:    Store food in sealed containers.    Remove garbage from the home promptly.    Fix water leaks  Mold:    Keep humidity low by using a dehumidifier or air conditioner. Keep the dehumidifier and air  conditioner clean and free of mold.    Clean moldy areas with bleach and water.  In general:    Vacuum once or twice a week. If possible, use a vacuum with a high-efficiency particulate air (HEPA) filter.    Do not smoke. Avoid cigarette smoke. Cigarette smoke is an irritant that can make symptoms worse.  Follow-up care  Follow up as advised by the healthcare provider or our staff. If you were referred to an allergy specialist, make this appointment promptly.  When to seek medical advice  Call your healthcare provider right away if the following occur:    Coughing or wheezing    Fever of 100.4 F (38 C) or higher, or as directed by your healthcare provider    Raised red bumps (hives)    Continuing symptoms, new symptoms, or worsening symptoms  Call 911 if you have:    Trouble breathing    Severe swelling of the face or severe itching of the eyes or mouth  Date Last Reviewed: 3/1/2017    [id number] [employer]. [address] [place] [address]. All rights reserved. This information is not intended as a substitute for professional medical care. Always follow your healthcare professional's instructions.

## 2018-06-02 DIAGNOSIS — L64.9 MALE PATTERN BALDNESS: ICD-10-CM

## 2018-06-05 RX ORDER — FINASTERIDE 1 MG/1
TABLET, FILM COATED ORAL
Qty: 90 TABLET | Refills: 3 | Status: ON HOLD | OUTPATIENT
Start: 2018-06-05 | End: 2019-06-17

## 2018-06-05 NOTE — TELEPHONE ENCOUNTER
Routing refill request to provider for review/approval because:  Drug not on the [facility] refill protocol     Last office visit with Dr Bruno 5/23/17    Sarahi Chen RN

## 2018-07-06 PROBLEM — G47.33 OSA (OBSTRUCTIVE SLEEP APNEA): Chronic | Status: ACTIVE | Noted: 2018-07-06

## 2018-07-06 PROBLEM — G47.33 OSA (OBSTRUCTIVE SLEEP APNEA): Status: ACTIVE | Noted: 2018-07-06

## 2018-07-09 ENCOUNTER — OFFICE VISIT (OUTPATIENT)
Dept: SLEEP MEDICINE | Facility: CLINIC | Age: 63
End: 2018-07-09
Payer: COMMERCIAL

## 2018-07-09 ENCOUNTER — DOCUMENTATION ONLY (OUTPATIENT)
Dept: LAB | Facility: CLINIC | Age: 63
End: 2018-07-09

## 2018-07-09 VITALS
WEIGHT: 201 LBS | HEART RATE: 63 BPM | DIASTOLIC BLOOD PRESSURE: 75 MMHG | SYSTOLIC BLOOD PRESSURE: 119 MMHG | BODY MASS INDEX: 27.22 KG/M2 | OXYGEN SATURATION: 95 % | HEIGHT: 72 IN

## 2018-07-09 DIAGNOSIS — E03.9 HYPOTHYROIDISM, UNSPECIFIED TYPE: ICD-10-CM

## 2018-07-09 DIAGNOSIS — R97.20 ELEVATED PROSTATE SPECIFIC ANTIGEN (PSA): ICD-10-CM

## 2018-07-09 DIAGNOSIS — C61 PROSTATE CANCER (H): Primary | ICD-10-CM

## 2018-07-09 DIAGNOSIS — G47.33 OBSTRUCTIVE SLEEP APNEA: Primary | ICD-10-CM

## 2018-07-09 PROCEDURE — 99203 OFFICE O/P NEW LOW 30 MIN: CPT | Performed by: PHYSICIAN ASSISTANT

## 2018-07-09 NOTE — PATIENT INSTRUCTIONS

## 2018-07-09 NOTE — PROGRESS NOTES
Patient has an upcoming previsit appointment on 07/12/2018. Please review pended orders and add additional orders if needed.     Thank you,   Migdalia Knight

## 2018-07-09 NOTE — PROGRESS NOTES
Sleep Consultation:    Date on this visit: 7/9/2018    Navneet Toussaint is sent by No ref. provider found for a sleep consultation.    Primary Physician: Bello Bruno     Chief Complaint   Patient presents with     Sleep Problem     consult- renewal of cpap equipment RX     Navneet Toussaint is a 62 year old male who initially presented in in 2013 for loud snoring,  daytime sleepiness leep maintenance insomnia. Sleep study recommended.    10/3/2013(197#)-AHI 11.2, RDI 14, lowest oxygen saturation 91%. He uses CPAP intermittently.     Last Sleep Medicine follow up in 2014 with Dr. Bardales.     Overall, he rates the experience with PAP as 7 (0 poor, 10 great). The mask is comfortable.    The mask is not leaking .  He is not snoring with the mask on. He is not having gasp arousals.  He is not having significant oral/nasal dryness. The pressure is comfortable.     His PAP interface is Nasal Mask.    Bedtime is typically 2200. Usually it takes about 15 min minutes to fall asleep with the mask on. Wake time is typically 0500.  Patient is using PAP therapy 5 hours per night. The patient is usually getting 7 hours of sleep per night.    He does feel rested in the morning.    Total score - [place]: 7 (7/9/2018 11:00 AM)    Respironics  Auto-PAP 5 - 15 cmH2O 30 day usage data:    16.7% of days with > 4 hours of use. 25/30 days with no use.   Average use 7 hours 26 minutes per day on days used.   Average time  of night in large leak 0 sec.  CPAP 90% pressure 6.5cm.   AHI 0.9 events per hour.      Allergies:    Allergies   Allergen Reactions     Nkda [No Known Drug Allergies]        Medications:    Current Outpatient Prescriptions   Medication Sig Dispense Refill     CIALIS 20 MG tablet TAKE 1 TABLET (20 MG) BY MOUTH DAILY AS NEEDED FOR ERECTILE DYSFUNCTION 10 tablet 3     finasteride (PROPECIA) 1 MG tablet TAKE 1 TABLET BY MOUTH ONCE DAILY 90 tablet 3     fluticasone (FLONASE) 50 MCG/ACT nasal spray Spray 2 sprays into both  nostrils daily 16 g 6     gabapentin (NEURONTIN) 800 MG tablet TAKE 1 TABLET BY MOUTH 3 TIMES DAILY 270 tablet 3     levothyroxine (SYNTHROID/LEVOTHROID) 112 MCG tablet TAKE 1 TABLET (112 MCG) BY MOUTH DAILY 30 tablet 0     omeprazole (PRILOSEC) 40 MG capsule Take 1 capsule (40 mg) by mouth daily Take 30-60 minutes before a meal. 30 capsule 4     tamsulosin (FLOMAX) 0.4 MG capsule TAKE 1 CAPSULE (0.4 MG) BY MOUTH DAILY 90 capsule 3     traMADol (ULTRAM) 50 MG tablet TAKE 1 TO 2 TABLETS BY MOUTH EVERY 4 HOURS AS NEEDED .MAX 8 TABS IN A  tablet 0       Problem List:  Patient Active Problem List    Diagnosis Date Noted     FLORES (obstructive sleep apnea) 07/06/2018     Priority: Medium     10/3/2013(197#)-AHI 11.2, RDI 14, lowest oxygen saturation 91%       Prostate cancer (H), adenocarcinoma 02/23/2018     Priority: Medium     Internal hemorrhoids, seen on colonoscopy 05/09/2017     Priority: Medium     Diverticulosis of large intestine without hemorrhage, seen on colonoscopy 05/09/2017     Priority: Medium     Benign non-nodular prostatic hyperplasia with lower urinary tract symptoms 04/10/2017     Priority: Medium     Chronic pain syndrome 04/25/2016     Priority: Medium     Patient is followed by SHWETA WARNER for ongoing prescription of pain medication.  All refills should be approved by this provider, or covering partner.    Medication(s): tramadol .   Maximum quantity per month: 90  Clinic visit frequency required: Q 6  months     Controlled substance agreement on file: Yes 11/30/2016      Pain Clinic evaluation in the past: Yes in 2011 with Dr Gricelda DE LA CRUZ Total Score(s):  No flowsheet data found.    Last [facility] website verification:  none   [email or web address]         Lumbar radiculopathy 12/07/2015     Priority: Medium     Numbness in feet 12/07/2015     Priority: Medium     IGT (impaired glucose tolerance) 10/16/2015     Priority: Medium     High triglycerides 09/28/2015     Priority:  Medium     Elevated prostate specific antigen (PSA), neg prostate bx 09/02/2015     Priority: Medium     Erectile dysfunction 05/28/2015     Priority: Medium     Pseudophakia, ou 03/09/2015     Priority: Medium     Idiopathic peripheral neuropathy 04/01/2011     Priority: Medium     Diagn[facility] by Dr Madison at the [facility]       Advanced directives, counseling/discussion 03/15/2011     Priority: Medium     Discussed Advance Directive planning with patient; information given to patient to review.    Mona Aparicio MA         Overweight (BMI 25.0-29.9) 03/15/2011     Priority: Medium     Hypothyroidism 02/02/2011     Priority: Medium     CARDIOVASCULAR SCREENING; LDL GOAL LESS THAN 160 10/31/2010     Priority: Medium     Male pattern baldness 06/07/2010     Priority: Medium     Social anxiety disorder      Priority: Medium        Past Medical/Surgical History:  Past Medical History:   Diagnosis Date     Cataract, mod, od; mild-mod, os 2/24/2015     Hypothyroid      Prostate cancer (H), adenocarcinoma 2/23/2018     Sleep apnea     doesnt use CPAP machine      Social anxiety disorder      Past Surgical History:   Procedure Laterality Date     BIOPSY  August 2015    prostate biopsy     CATARACT IOL, RT/LT Right 03/2015     [facility] SURGICAL PATHOLOGY       COLONOSCOPY  2005     COLONOSCOPY WITH CO2 INSUFFLATION N/A 5/9/2017    Procedure: COLONOSCOPY WITH CO2 INSUFFLATION;  COLON SCREEN/ SYPURA;  Surgeon: Flex Jones MD;  Location: [facility] OR     PHACOEMULSIFICATION WITH STANDARD INTRAOCULAR LENS IMPLANT  03/2018    left eye     VASECTOMY         Social History:  Social History     Social History     Marital status:      Spouse name: N/A     Number of children: N/A     Years of education: N/A     Occupational History     Not on file.     Social History Main Topics     Smoking status: Former Smoker     Types: Cigarettes     Quit date: 10/1/1979     Smokeless tobacco: Never Used      Comment: Lives in smokefree  house     Alcohol use No      Comment: weekly     Drug use: No     Sexual activity: Yes     Partners: Female     Birth control/ protection: Male Surgical     Other Topics Concern     Parent/Sibling W/ Cabg, Mi Or Angioplasty Before 65f 55m? Yes     Brother at age 52     Social History Narrative       Family History:  Family History   Problem Relation Age of Onset     Respiratory Father      HEART DISEASE Brother      Mi at age 52 passed away     Alzheimer Disease Mother      dementia     Diabetes Mother      Cancer No family hx of      Hypertension No family hx of      Cerebrovascular Disease No family hx of      Thyroid Disease No family hx of      Glaucoma No family hx of      Macular Degeneration No family hx of        Review of Systems:  CONSTITUTIONAL: NEGATIVE for weight gain/loss, fever, chills, sweats or night sweats, drug allergies.  EYES: NEGATIVE for changes in vision, blind spots, double vision.  ENT: NEGATIVE for ear pain, sore throat, sinus pain, post-nasal drip, runny nose, bloody nose  CARDIAC: NEGATIVE for fast heartbeats or fluttering in chest, chest pain or pressure, breathlessness when lying flat, swollen legs or swollen feet.  NEUROLOGIC: NEGATIVE headaches, weakness or numbness in the arms or legs.  DERMATOLOGIC: NEGATIVE for rashes, new moles or change in mole(s)  PULMONARY: NEGATIVE SOB at rest, SOB with activity, dry cough, productive cough, coughing up blood, wheezing or whistling when breathing.    GASTROINTESTINAL: NEGATIVE for nausea or vomitting, loose or watery stools, fat or grease in stools, constipation, abdominal pain, bowel movements black in color or blood noted.  GENITOURINARY: NEGATIVE for pain during urination, blood in urine, urinating more frequently than usual, irregular menstrual periods.  MUSCULOSKELETAL: NEGATIVE for muscle pain, bone or joint pain, swollen joints.  ENDOCRINE: NEGATIVE for increased thirst or urination, diabetes.  LYMPHATIC: NEGATIVE for swollen lymph  nodes, lumps or bumps in the breasts or nipple discharge.    Physical Examination:  Vitals: /75  Pulse 63  Ht 1.829 m (6')  Wt 91.2 kg (201 lb)  SpO2 95%  BMI 27.26 kg/m2  BMI= Body mass index is 27.26 kg/(m^2).         [place] Total Score 7/9/2018   Total score - [place] 7       ROD Total Score: 11 (07/09/18 1100)    GENERAL APPEARANCE: alert and no distress  EYES: Eyes grossly normal to inspection, PERRL and conjunctivae and sclerae normal  HENT: oropharynx crowded  NECK: no adenopathy, no asymmetry, masses, or scars and thyroid normal to palpation  PSYCH: mentation appears normal and affect normal/bright  Impression/Plan:  1. Mild obstructive sleep apnea-  Poor compliance.   Patient advised to increase CPAP use, consider transitioning to oral appliance. He wants to continue on CPAP.   Patient is moving to [facility] and was advised to establish care with a sleep provider there. We provided him with his records today.     He will follow up in 1 year if he desires to continue to get care via  [facility].     Candy Huff PA-C    CC: No ref. provider found

## 2018-07-09 NOTE — PROGRESS NOTES
Please review and sign Pending Pre-visit Labs in Epic. Labs 07/12/18 and OV thyroid 07/19/18 PT requesting PSA  Adriane VALENTINE

## 2018-07-09 NOTE — NURSING NOTE
Chief Complaint   Patient presents with     Sleep Problem     consult- renewal of cpap equipment RX       Initial /75  Pulse 63  Ht 1.829 m (6')  Wt 91.2 kg (201 lb)  SpO2 95%  BMI 27.26 kg/m2 Estimated body mass index is 27.26 kg/(m^2) as calculated from the following:    Height as of this encounter: 1.829 m (6').    Weight as of this encounter: 91.2 kg (201 lb).    Medication Reconciliation: complete

## 2018-07-09 NOTE — MR AVS SNAPSHOT
After Visit Summary   7/9/2018    Navneet Toussaint    MRN: [id number]           Patient Information     Date Of Birth          1955        Visit Information        Provider Department      7/9/2018 11:00 AM Candy Huff PA [facility]        Today's Diagnoses     Obstructive sleep apnea    -  1      Care Instructions      Your BMI is Body mass index is 27.26 kg/(m^2).  Weight management is a personal decision.  If you are interested in exploring weight loss strategies, the following discussion covers the approaches that may be successful. Body mass index (BMI) is one way to tell whether you are at a healthy weight, overweight, or obese. It measures your weight in relation to your height.  A BMI of 18.5 to 24.9 is in the healthy range. A person with a BMI of 25 to 29.9 is considered overweight, and someone with a BMI of 30 or greater is considered obese. More than two-thirds of American adults are considered overweight or obese.  Being overweight or obese increases the risk for further weight gain. Excess weight may lead to heart disease and diabetes.  Creating and following plans for healthy eating and physical activity may help you improve your health.  Weight control is part of healthy lifestyle and includes exercise, emotional health, and healthy eating habits. Careful eating habits lifelong are the mainstay of weight control. Though there are significant health benefits from weight loss, long-term weight loss with diet alone may be very difficult to achieve- studies show long-term success with dietary management in less than 10% of people. Attaining a healthy weight may be especially difficult to achieve in those with severe obesity. In some cases, medications, devices and surgical management might be considered.  What can you do?  If you are overweight or obese and are interested in methods for weight loss, you should discuss this with your provider.     Consider reducing daily  calorie intake by 500 calories.     Keep a food journal.     Avoiding skipping meals, consider cutting portions instead.    Diet combined with exercise helps maintain muscle while optimizing fat loss. Strength training is particularly important for building and maintaining muscle mass. Exercise helps reduce stress, increase energy, and improves fitness. Increasing exercise without diet control, however, may not burn enough calories to loose weight.       Start walking three days a week 10-20 minutes at a time    Work towards walking thirty minutes five days a week     Eventually, increase the speed of your walking for 1-2 minutes at time    In addition, we recommend that you review healthy lifestyles and methods for weight loss available through the National Institutes of Health patient information sites:  http://win.niddk.nih.gov/publications/index.htm    And look into health and wellness programs that may be available through your health insurance provider, employer, local community center, or franki club.    Weight management plan: Patient was referred to their PCP to discuss a diet and exercise plan.              Follow-ups after your visit        Your next 10 appointments already scheduled     Jul 12, 2018  4:00 PM CDT   Return Visit with Mark Miller MD   [facility]    [address] [place] [address] [phone]              Who to contact     If you have questions or need follow up information about today's clinic visit or your schedule please contact [facility] directly at [phone].  Normal or non-critical lab and imaging results will be communicated to you by MyChart, letter or phone within 4 business days after the clinic has received the results. If you do not hear from us within 7 days, please contact the clinic through MyChart or phone. If you have a critical or abnormal lab result, we will notify you by phone as soon as  possible.  Submit refill requests through [facility] or call your pharmacy and they will forward the refill request to us. Please allow 3 business days for your refill to be completed.          Additional Information About Your Visit        Klick2Contacthart Information     Hoard gives you secure access to your electronic health record. If you see a primary care provider, you can also send messages to your care team and make appointments. If you have questions, please call your primary care clinic.  If you do not have a primary care provider, please call [phone] and they will assist you.        Care EveryWhere ID     This is your Care EveryWhere ID. This could be used by other organizations to access your [place] medical records  [id number]        Your Vitals Were     Pulse Height Pulse Oximetry BMI (Body Mass Index)          63 1.829 m (6') 95% 27.26 kg/m2         Blood Pressure from Last 3 Encounters:   07/09/18 119/75   05/22/18 133/84   03/13/18 116/62    Weight from Last 3 Encounters:   07/09/18 91.2 kg (201 lb)   05/22/18 90.8 kg (200 lb 3.2 oz)   03/13/18 93.4 kg (206 lb)              We Performed the Following     Comprehensive DME        Primary Care Provider Office Phone # Fax #    Bello Bruno [phone] [address] [facility] [address]        Equal Access to Services     JANICE SONG : Hadii aad ku hadasho Soomaali, waaxda luqadaha, qaybta kaalmada adeloryyada, cory prince. So [facility] [phone].    ATENCIÓN: Si habla español, tiene a florian disposición servicios gratuitos de asistencia lingüística. Luis Alberto al [phone].    We comply with applicable federal civil rights laws and [place] laws. We do not discriminate on the basis of race, color, national origin, age, disability, sex, sexual orientation, or gender identity.            Thank you!     Thank you for choosing [facility]  for your care. Our goal is always to provide you  with excellent care. Hearing back from our patients is one way we can continue to improve our services. Please take a few minutes to complete the written survey that you may receive in the mail after your visit with us. Thank you!             Your Updated Medication List - Protect others around you: Learn how to safely use, store and throw away your medicines at www.disposemymeds.org.          This list is accurate as of 7/9/18 12:20 PM.  Always use your most recent med list.                   Brand Name Dispense Instructions for use Diagnosis    CIALIS 20 MG tablet   Generic drug:  tadalafil     10 tablet    TAKE 1 TABLET (20 MG) BY MOUTH DAILY AS NEEDED FOR ERECTILE DYSFUNCTION    Erectile dysfunction, unspecified erectile dysfunction type       finasteride 1 MG tablet    PROPECIA    90 tablet    TAKE 1 TABLET BY MOUTH ONCE DAILY    Male pattern baldness       fluticasone 50 MCG/ACT spray    FLONASE    16 g    Spray 2 sprays into both nostrils daily    Seasonal allergic rhinitis, unspecified allergic rhinitis trigger       gabapentin 800 MG tablet    NEURONTIN    270 tablet    TAKE 1 TABLET BY MOUTH 3 TIMES DAILY    Idiopathic peripheral neuropathy       levothyroxine 112 MCG tablet    SYNTHROID/LEVOTHROID    30 tablet    TAKE 1 TABLET (112 MCG) BY MOUTH DAILY    Hypothyroidism, unspecified type       omeprazole 40 MG capsule    priLOSEC    30 capsule    Take 1 capsule (40 mg) by mouth daily Take 30-60 minutes before a meal.    Gastroesophageal reflux disease without esophagitis       tamsulosin 0.4 MG capsule    FLOMAX    90 capsule    TAKE 1 CAPSULE (0.4 MG) BY MOUTH DAILY    Benign non-nodular prostatic hyperplasia with lower urinary tract symptoms       traMADol 50 MG tablet    ULTRAM    270 tablet    TAKE 1 TO 2 TABLETS BY MOUTH EVERY 4 HOURS AS NEEDED .MAX 8 TABS IN A DAY    Idiopathic neuropathy, Chronic pain syndrome

## 2018-07-12 ENCOUNTER — OFFICE VISIT (OUTPATIENT)
Dept: OPHTHALMOLOGY | Facility: CLINIC | Age: 63
End: 2018-07-12
Payer: COMMERCIAL

## 2018-07-12 DIAGNOSIS — C61 PROSTATE CANCER (H): ICD-10-CM

## 2018-07-12 DIAGNOSIS — Z96.1 PSEUDOPHAKIA: Primary | ICD-10-CM

## 2018-07-12 DIAGNOSIS — E03.9 HYPOTHYROIDISM, UNSPECIFIED TYPE: ICD-10-CM

## 2018-07-12 DIAGNOSIS — R97.20 ELEVATED PROSTATE SPECIFIC ANTIGEN (PSA): ICD-10-CM

## 2018-07-12 LAB
PSA SERPL-ACNC: 6 UG/L (ref 0–4)
TSH SERPL DL<=0.005 MIU/L-ACNC: 2.41 MU/L (ref 0.4–4)

## 2018-07-12 PROCEDURE — 99212 OFFICE O/P EST SF 10 MIN: CPT | Performed by: OPHTHALMOLOGY

## 2018-07-12 PROCEDURE — 36415 COLL VENOUS BLD VENIPUNCTURE: CPT | Performed by: FAMILY MEDICINE

## 2018-07-12 PROCEDURE — 84443 ASSAY THYROID STIM HORMONE: CPT | Performed by: FAMILY MEDICINE

## 2018-07-12 PROCEDURE — G0103 PSA SCREENING: HCPCS | Performed by: FAMILY MEDICINE

## 2018-07-12 ASSESSMENT — VISUAL ACUITY
METHOD: SNELLEN - LINEAR
OS_PH_SC: 20/20
OS_SC: 20/25
OD_SC: 20/20

## 2018-07-12 ASSESSMENT — TONOMETRY
OS_IOP_MMHG: 12
OD_IOP_MMHG: 12
IOP_METHOD: APPLANATION

## 2018-07-12 NOTE — MR AVS SNAPSHOT
After Visit Summary   7/12/2018    Navneet Toussaint    MRN: [id number]           Patient Information     Date Of Birth          1955        Visit Information        Provider Department      7/12/2018 4:00 PM Mark Miller MD [facility]        Care Instructions    Possible clouding of posterior capsule discussed.  Call in March 2019 for an appointment in July 2019 for Complete Exam    Dr. Miller [phone]          Follow-ups after your visit        Your next 10 appointments already scheduled     Jul 19, 2018  4:15 PM CDT   Office Visit with Bello Bruno MD   [facility]    [address] [facility] [address] [phone]           Bring a current list of meds and any records pertaining to this visit. For Physicals, please bring immunization records and any forms needing to be filled out. Please arrive 10 minutes early to complete paperwork.              Who to contact     If you have questions or need follow up information about today's clinic visit or your schedule please contact [facility] directly at [phone].  Normal or non-critical lab and imaging results will be communicated to you by Medical Depothart, letter or phone within 4 business days after the clinic has received the results. If you do not hear from us within 7 days, please contact the clinic through Pronotat or phone. If you have a critical or abnormal lab result, we will notify you by phone as soon as possible.  Submit refill requests through [facility] or call your pharmacy and they will forward the refill request to us. Please allow 3 business days for your refill to be completed.          Additional Information About Your Visit        Medical DepotharNERITES Information     [facility] gives you secure access to your electronic health record. If you see a primary care provider, you can also send messages to your care team and make appointments. If you have questions,  please call your primary care clinic.  If you do not have a primary care provider, please call [phone] and they will assist you.        Care EveryWhere ID     This is your Care EveryWhere ID. This could be used by other organizations to access your [place] medical records  [id number]         Blood Pressure from Last 3 Encounters:   07/09/18 119/75   05/22/18 133/84   03/13/18 116/62    Weight from Last 3 Encounters:   07/09/18 91.2 kg (201 lb)   05/22/18 90.8 kg (200 lb 3.2 oz)   03/13/18 93.4 kg (206 lb)              Today, you had the following     No orders found for display       Primary Care Provider Office Phone # Fax #    Bello Bruno [phone] [address] [facility] [address]        Equal Access to Services     [facility]: Hadii aad quique hadasho Soomaali, waaxda luqadaha, qaybta kaalmada adeegyada, cory tyler hayvielka ospina . So [facility] [phone].    ATENCIÓN: Si habla español, tiene a florian disposición servicios gratuitos de asistencia lingüística. Llame al [phone].    We comply with applicable federal civil rights laws and [place] laws. We do not discriminate on the basis of race, color, national origin, age, disability, sex, sexual orientation, or gender identity.            Thank you!     Thank you for choosing [facility]  for your care. Our goal is always to provide you with excellent care. Hearing back from our patients is one way we can continue to improve our services. Please take a few minutes to complete the written survey that you may receive in the mail after your visit with us. Thank you!             Your Updated Medication List - Protect others around you: Learn how to safely use, store and throw away your medicines at www.disposemymeds.org.          This list is accurate as of 7/12/18  5:34 PM.  Always use your most recent med list.                   Brand Name Dispense Instructions for use Diagnosis    CIALIS 20 MG  tablet   Generic drug:  tadalafil     10 tablet    TAKE 1 TABLET (20 MG) BY MOUTH DAILY AS NEEDED FOR ERECTILE DYSFUNCTION    Erectile dysfunction, unspecified erectile dysfunction type       finasteride 1 MG tablet    PROPECIA    90 tablet    TAKE 1 TABLET BY MOUTH ONCE DAILY    Male pattern baldness       fluticasone 50 MCG/ACT spray    FLONASE    16 g    Spray 2 sprays into both nostrils daily    Seasonal allergic rhinitis, unspecified allergic rhinitis trigger       gabapentin 800 MG tablet    NEURONTIN    270 tablet    TAKE 1 TABLET BY MOUTH 3 TIMES DAILY    Idiopathic peripheral neuropathy       levothyroxine 112 MCG tablet    SYNTHROID/LEVOTHROID    30 tablet    TAKE 1 TABLET (112 MCG) BY MOUTH DAILY    Hypothyroidism, unspecified type       omeprazole 40 MG capsule    priLOSEC    30 capsule    Take 1 capsule (40 mg) by mouth daily Take 30-60 minutes before a meal.    Gastroesophageal reflux disease without esophagitis       tamsulosin 0.4 MG capsule    FLOMAX    90 capsule    TAKE 1 CAPSULE (0.4 MG) BY MOUTH DAILY    Benign non-nodular prostatic hyperplasia with lower urinary tract symptoms       traMADol 50 MG tablet    ULTRAM    270 tablet    TAKE 1 TO 2 TABLETS BY MOUTH EVERY 4 HOURS AS NEEDED .MAX 8 TABS IN A DAY    Idiopathic neuropathy, Chronic pain syndrome

## 2018-07-12 NOTE — LETTER
7/12/2018         RE: Navneet Toussaint  [address] [place] [address]  [place] [address]        Dear Colleague,    Thank you for referring your patient, Navneet Toussaint, to the [facility]. Please see a copy of my visit note below.     Current Eye Medications:  no     Subjective:  3 mo post KPE iop left eye.  Patient reports is seeing well, vision seems unchanged and states eyes seem otherwise fine.     +2.00 readers only work well.  Notes some increased near vision after left eye surgery.  Mild dysphotopsia; improving.     Objective:  See Ophthalmology Exam.       Assessment:  Doing well status/post Kelman phacoemulsification/ posterior chamber lens left eye.      Plan:  Possible clouding of posterior capsule discussed.  Call in March 2019 for an appointment in July 2019 for Complete Exam    Dr. Miller [phone]         Again, thank you for allowing me to participate in the care of your patient.        Sincerely,        Mark Miller MD

## 2018-07-12 NOTE — PATIENT INSTRUCTIONS
Possible clouding of posterior capsule discussed.  Call in March 2019 for an appointment in July 2019 for Complete Exam    Dr. Miller [phone]

## 2018-07-12 NOTE — PROGRESS NOTES
Current Eye Medications:  no     Subjective:  3 mo post KPE iop left eye.  Patient reports is seeing well, vision seems unchanged and states eyes seem otherwise fine.     +2.00 readers only work well.  Notes some increased near vision after left eye surgery.  Mild dysphotopsia; improving.     Objective:  See Ophthalmology Exam.       Assessment:  Doing well status/post Kelman phacoemulsification/ posterior chamber lens left eye.      Plan:  Possible clouding of posterior capsule discussed.  Call in March 2019 for an appointment in July 2019 for Complete Exam    Dr. iMller [phone]

## 2018-07-13 ASSESSMENT — SLIT LAMP EXAM - LIDS
COMMENTS: 1+ DERMATOCHALASIS - UPPER LID
COMMENTS: 1+ DERMATOCHALASIS - UPPER LID

## 2018-07-13 ASSESSMENT — EXTERNAL EXAM - RIGHT EYE: OD_EXAM: 1+ BROW PTOSIS

## 2018-07-13 ASSESSMENT — EXTERNAL EXAM - LEFT EYE: OS_EXAM: NORMAL

## 2018-07-13 NOTE — PROGRESS NOTES
I have reviewed the schedule of future appointments and this patient has an appointment scheduled for 7-.    Visit date not found

## 2018-07-19 ENCOUNTER — OFFICE VISIT (OUTPATIENT)
Dept: FAMILY MEDICINE | Facility: CLINIC | Age: 63
End: 2018-07-19
Payer: COMMERCIAL

## 2018-07-19 VITALS
RESPIRATION RATE: 18 BRPM | TEMPERATURE: 98.7 F | BODY MASS INDEX: 26.99 KG/M2 | WEIGHT: 199 LBS | OXYGEN SATURATION: 96 % | DIASTOLIC BLOOD PRESSURE: 60 MMHG | SYSTOLIC BLOOD PRESSURE: 105 MMHG | HEART RATE: 55 BPM

## 2018-07-19 DIAGNOSIS — N52.9 ERECTILE DYSFUNCTION, UNSPECIFIED ERECTILE DYSFUNCTION TYPE: ICD-10-CM

## 2018-07-19 DIAGNOSIS — G60.9 IDIOPATHIC NEUROPATHY: ICD-10-CM

## 2018-07-19 DIAGNOSIS — G89.4 CHRONIC PAIN SYNDROME: ICD-10-CM

## 2018-07-19 DIAGNOSIS — E03.9 HYPOTHYROIDISM, UNSPECIFIED TYPE: ICD-10-CM

## 2018-07-19 DIAGNOSIS — J30.2 CHRONIC SEASONAL ALLERGIC RHINITIS, UNSPECIFIED TRIGGER: Primary | ICD-10-CM

## 2018-07-19 PROCEDURE — 99213 OFFICE O/P EST LOW 20 MIN: CPT | Performed by: FAMILY MEDICINE

## 2018-07-19 RX ORDER — FLUTICASONE PROPIONATE 50 MCG
2 SPRAY, SUSPENSION (ML) NASAL DAILY
Qty: 16 G | Refills: 6 | Status: ON HOLD | OUTPATIENT
Start: 2018-07-19 | End: 2019-06-17

## 2018-07-19 RX ORDER — TADALAFIL 20 MG/1
TABLET ORAL
Qty: 10 TABLET | Refills: 11 | Status: SHIPPED | OUTPATIENT
Start: 2018-07-19 | End: 2019-08-17

## 2018-07-19 RX ORDER — TRAMADOL HYDROCHLORIDE 50 MG/1
TABLET ORAL
Qty: 270 TABLET | Refills: 2 | Status: SHIPPED | OUTPATIENT
Start: 2018-07-19 | End: 2019-03-12

## 2018-07-19 RX ORDER — LEVOTHYROXINE SODIUM 112 UG/1
TABLET ORAL
Qty: 90 TABLET | Refills: 3 | Status: SHIPPED | OUTPATIENT
Start: 2018-07-19

## 2018-07-19 NOTE — MR AVS SNAPSHOT
After Visit Summary   7/19/2018    Navneet Toussaint    MRN: [id number]           Patient Information     Date Of Birth          1955        Visit Information        Provider Department      7/19/2018 4:15 PM Bello Bruno MD [facility]        Today's Diagnoses     Chronic seasonal allergic rhinitis, unspecified trigger    -  1    Hypothyroidism, unspecified type        Erectile dysfunction, unspecified erectile dysfunction type        Idiopathic neuropathy        Chronic pain syndrome           Follow-ups after your visit        Who to contact     If you have questions or need follow up information about today's clinic visit or your schedule please contact [facility] directly at [phone].  Normal or non-critical lab and imaging results will be communicated to you by MyChart, letter or phone within 4 business days after the clinic has received the results. If you do not hear from us within 7 days, please contact the clinic through [employer] or phone. If you have a critical or abnormal lab result, we will notify you by phone as soon as possible.  Submit refill requests through [employer] or call your pharmacy and they will forward the refill request to us. Please allow 3 business days for your refill to be completed.          Additional Information About Your Visit        MyChart Information     [employer] gives you secure access to your electronic health record. If you see a primary care provider, you can also send messages to your care team and make appointments. If you have questions, please call your primary care clinic.  If you do not have a primary care provider, please call [phone] and they will assist you.        Care EveryWhere ID     This is your Care EveryWhere ID. This could be used by other organizations to access your [facility] medical records  [id number]        Your Vitals Were     Pulse Temperature Respirations Pulse Oximetry BMI (Body Mass Index)        55 98.7  F (37.1  C) (Oral) 18 96% 26.99 kg/m2        Blood Pressure from Last 3 Encounters:   07/19/18 105/60   07/09/18 119/75   05/22/18 133/84    Weight from Last 3 Encounters:   07/19/18 199 lb (90.3 kg)   07/09/18 201 lb (91.2 kg)   05/22/18 200 lb 3.2 oz (90.8 kg)              Today, you had the following     No orders found for display         Where to get your medicines      These medications were sent to [facility]/pharmacy #[id number] - [place], [place] - [address] [facility]  [address], [place] [address]     Phone:  [phone]     fluticasone 50 MCG/ACT spray    levothyroxine 112 MCG tablet    tadalafil 20 MG tablet         Some of these will need a paper prescription and others can be bought over the counter.  Ask your nurse if you have questions.     Bring a paper prescription for each of these medications     traMADol 50 MG tablet         Information about OPIOIDS     PRESCRIPTION OPIOIDS: WHAT YOU NEED TO KNOW   We gave you an opioid (narcotic) pain medicine. It is important to manage your pain, but opioids are not always the best choice. You should first try all the other options your care team gave you. Take this medicine for as short a time (and as few doses) as possible.     These medicines have risks:    DO NOT drive when on new or higher doses of pain medicine. These medicines can affect your alertness and reaction times, and you could be arrested for driving under the influence (DUI). If you need to use opioids long-term, talk to your care team about driving.    DO NOT operate heave machinery    DO NOT do any other dangerous activities while taking these medicines.     DO NOT drink any alcohol while taking these medicines.      If the opioid prescribed includes acetaminophen, DO NOT take with any other medicines that contain acetaminophen. Read all labels carefully. Look for the word  acetaminophen  or  Tylenol.  Ask your pharmacist if you have questions or are  unsure.    You can get addicted to pain medicines, especially if you have a history of addiction (chemical, alcohol or substance dependence). Talk to your care team about ways to reduce this risk.    Store your pills in a secure place, locked if possible. We will not replace any lost or stolen medicine. If you don t finish your medicine, please throw away (dispose) as directed by your pharmacist. The [place] Pollution Control Agency has more information about safe disposal: https://www.pca.[facility].[place].us/living-green/managing-unwanted-medications.     All opioids tend to cause constipation. Drink plenty of water and eat foods that have a lot of fiber, such as fruits, vegetables, prune juice, apple juice and high-fiber cereal. Take a laxative (Miralax, milk of magnesia, Colace, Senna) if you don t move your bowels at least every other day.          Primary Care Provider Office Phone # Fax #    Bello Bruno [phone] [address] [facility] [address]        Equal Access to Services     BRIANNA SONG : Hadii pepito ku hadasho Soomaali, waaxda luqadaha, qaybta kaalmada adeegyada, cory ospina . So [facility] [phone].    ATENCIÓN: Si habla español, tiene a florian disposición servicios gratuitos de asistencia lingüística. Llame al [phone].    We comply with applicable federal civil rights laws and [place] laws. We do not discriminate on the basis of race, color, national origin, age, disability, sex, sexual orientation, or gender identity.            Thank you!     Thank you for choosing [facility]  for your care. Our goal is always to provide you with excellent care. Hearing back from our patients is one way we can continue to improve our services. Please take a few minutes to complete the written survey that you may receive in the mail after your visit with us. Thank you!             Your Updated Medication List - Protect others around you: Learn  how to safely use, store and throw away your medicines at www.disposemymeds.org.          This list is accurate as of 7/19/18  4:50 PM.  Always use your most recent med list.                   Brand Name Dispense Instructions for use Diagnosis    finasteride 1 MG tablet    PROPECIA    90 tablet    TAKE 1 TABLET BY MOUTH ONCE DAILY    Male pattern baldness       fluticasone 50 MCG/ACT spray    FLONASE    16 g    Spray 2 sprays into both nostrils daily    Chronic seasonal allergic rhinitis, unspecified trigger       gabapentin 800 MG tablet    NEURONTIN    270 tablet    TAKE 1 TABLET BY MOUTH 3 TIMES DAILY    Idiopathic peripheral neuropathy       levothyroxine 112 MCG tablet    SYNTHROID/LEVOTHROID    90 tablet    TAKE 1 TABLET (112 MCG) BY MOUTH DAILY    Hypothyroidism, unspecified type       tadalafil 20 MG tablet    CIALIS    10 tablet    TAKE 1 TABLET (20 MG) BY MOUTH DAILY AS NEEDED FOR ERECTILE DYSFUNCTION    Erectile dysfunction, unspecified erectile dysfunction type       tamsulosin 0.4 MG capsule    FLOMAX    90 capsule    TAKE 1 CAPSULE (0.4 MG) BY MOUTH DAILY    Benign non-nodular prostatic hyperplasia with lower urinary tract symptoms       traMADol 50 MG tablet    ULTRAM    270 tablet    TAKE 1 TO 2 TABLETS BY MOUTH EVERY 4 HOURS AS NEEDED .MAX 8 TABS IN A DAY    Idiopathic neuropathy, Chronic pain syndrome

## 2018-07-19 NOTE — PROGRESS NOTES
HPI:  Navneet Toussaint is a 62 year old male presenting for follow up evaluation of hypothyroidism. The patient was diagnosed with hypothyroidism 10 years ago. He is currently taking 112 mcg of Synthroid, Levoxyl (Levothyroxine). He  has been taking this dose for about 2 years. The patient reports that he  has not missed any doses of the medication.The patient denies symptoms of hypothyroidism. The patient denies  having symptoms of  fatigue, depression, thinning hair or hair loss, dry skin, cold intolerance and constipation      Patient Active Problem List   Diagnosis     Social anxiety disorder     Male pattern baldness     CARDIOVASCULAR SCREENING; LDL GOAL LESS THAN 160     Hypothyroidism     Advanced directives, counseling/discussion     Overweight (BMI 25.0-29.9)     Idiopathic peripheral neuropathy     Pseudophakia, ou     Erectile dysfunction     Elevated prostate specific antigen (PSA), neg prostate bx     High triglycerides     IGT (impaired glucose tolerance)     Lumbar radiculopathy     Numbness in feet     Chronic pain syndrome     Benign non-nodular prostatic hyperplasia with lower urinary tract symptoms     Internal hemorrhoids, seen on colonoscopy     Diverticulosis of large intestine without hemorrhage, seen on colonoscopy     Prostate cancer (H), adenocarcinoma     FLORES (obstructive sleep apnea)       Patient Active Problem List 07/19/2018 - Flex as Reviewed 07/19/2018   -- NKDA [NO KNOWN DRUG ALLERGIES] --  -- noted 04/01/2011    Current Outpatient Prescriptions   Medication     CIALIS 20 MG tablet     finasteride (PROPECIA) 1 MG tablet     fluticasone (FLONASE) 50 MCG/ACT nasal spray     gabapentin (NEURONTIN) 800 MG tablet     levothyroxine (SYNTHROID/LEVOTHROID) 112 MCG tablet     omeprazole (PRILOSEC) 40 MG capsule     tamsulosin (FLOMAX) 0.4 MG capsule     traMADol (ULTRAM) 50 MG tablet     No current facility-administered medications for this visit.        OBJECTIVE:   Exam:  /60  Pulse 55   Temp 98.7  F (37.1  C) (Oral)  Resp 18  Wt 199 lb (90.3 kg)  SpO2 96%  BMI 26.99 kg/m2    Constitutional: healthy, alert and no distress  EYES: No exopthalmos and eyebrows are full.  Neck: normal to inspection and palpation    Cardiovascular: negative, PMI normal. No lifts, heaves, or thrills. RRR. No murmurs, clicks gallops or rub    Skin: normal with no significant xerosis    Lower Extremities: No edema on right and No edema on the left    Psychiatric: affect normal/bright and mentation appears normal.    TSH   Date Value Ref Range Status   07/12/2018 2.41 0.40 - 4.00 mU/L Final   ]      ASSESSMENT / PLAN  1) Hypothyroidism: we discussed the recommendation to get the TSH mat 2.0 but other conflicting recommend(ed) for older patients to not push the TSH down that low.   He is currently asymptomatic from the thyroid stance so we will keep his dse the same.   We will need to recheck a TSH in 1 year or sooner as needed symptom(s).       He is moving to [facility] I recommend(ed) that he establish with a provider up there by setting up a complete physical exam once he is settled up there.

## 2018-07-21 DIAGNOSIS — E03.9 HYPOTHYROIDISM, UNSPECIFIED TYPE: ICD-10-CM

## 2018-07-23 RX ORDER — LEVOTHYROXINE SODIUM 112 UG/1
TABLET ORAL
OUTPATIENT
Start: 2018-07-23

## 2018-10-15 ENCOUNTER — TELEPHONE (OUTPATIENT)
Dept: FAMILY MEDICINE | Facility: CLINIC | Age: 63
End: 2018-10-15

## 2018-10-15 DIAGNOSIS — L64.9 MALE PATTERN BALDNESS: Primary | ICD-10-CM

## 2018-10-15 NOTE — TELEPHONE ENCOUNTER
Prior Authorization Retail Medication Request    Medication/Dose:finasteride (PROPECIA) 1 MG tablet   ICD code (if different than what is on RX):  Male pattern baldness [L64.9]   Previously Tried and Failed:    Rationale:      Insurance Name:  [facility]  Insurance ID:  [id number]      Pharmacy Information (if different than what is on RX)  Name:  [facility]  Phone:  [phone]

## 2018-10-15 NOTE — TELEPHONE ENCOUNTER
PA Initiation    Medication: finasteride   Insurance Company: [employer] [place] - Phone [phone] Fax [phone]  Pharmacy Filling the Rx: [id number] - [place] - [address]  Filling Pharmacy Phone: [phone]  Filling Pharmacy Fax:    Start Date: 10/15/2018    Central Prior Authorization Team   Phone: [phone]

## 2018-10-17 NOTE — TELEPHONE ENCOUNTER
Called patient's insurance at [phone] to check status of this prior auth, per representative this has been denied, she will resend out denial letter, will document once received

## 2018-10-22 NOTE — TELEPHONE ENCOUNTER
PRIOR AUTHORIZATION DENIED    Medication: finasteride     Denial Date: 10/17/2018    Denial Rational:  Denied as this medication is excluded        Appeal Information:

## 2018-10-23 NOTE — TELEPHONE ENCOUNTER
RN returned call to pt. Pt states that the finasteride had been originally prescribed for male pattern baldness, but pt has prostate issues too.    Per chart review pt has dx of prostate CA and also:  Relevant Results            7/12/18 6/27/16 10/9/15 7/28/15 7/1/15     Chemistry     PSA  6.00 (H) 8.86 (H) 6.33 (H) 5.24 (H) 4.12 (H)           Last Encounter with Benign non-nodular prostatic hyperplasia with lower urinary tract symptoms     Pt sees Urology provider at [facility]. See 3/14/18 scanned records.    Nida Marshall RN

## 2018-10-23 NOTE — TELEPHONE ENCOUNTER
Please contact the patient. I do not remember prescribing the finasteride for male pattern baldness.  Please ask him if we were prescribing it for en enlarged prostate. I can then change the diagnosis and see if it is covered.   Bello Bruno MD

## 2018-10-24 RX ORDER — FINASTERIDE 5 MG/1
TABLET, FILM COATED ORAL
Qty: 23 TABLET | Refills: 3 | Status: ON HOLD | OUTPATIENT
Start: 2018-10-24 | End: 2019-06-18

## 2018-10-24 RX ORDER — FINASTERIDE 5 MG/1
TABLET, FILM COATED ORAL
Qty: 23 TABLET | Refills: 3 | Status: SHIPPED | OUTPATIENT
Start: 2018-10-24 | End: 2018-10-24

## 2018-10-24 NOTE — TELEPHONE ENCOUNTER
Left message on voicemail for patient to call back.     Direct number given: [phone].  Marisela Soriano, YASHN, RN

## 2018-10-24 NOTE — TELEPHONE ENCOUNTER
Called patient and gave providers message/ instructions.  Patient states he understands this.     He has already picked up the 1 mg size tablets so he has enough medication for 3 months but understands when he picks up next order in 3 months, it will be the 5 mg size tablet and will break into 4ths with a pill cutter.   RN informed patient where he can buy a pill cutter.     RN resent Rx to preferred pharmacy as patient has retired and moved [place] north to [place], [place].  This RN updated address and phone numbers.   RN called the [facility] pharmacy and left detailed voicemail to cancel Rx for 5 mg finasteride.    YASH HernandezN RN

## 2018-10-24 NOTE — TELEPHONE ENCOUNTER
There is no alternative for treatment of male pattern baldness. However one cheaper option is to take 1/4 of a tablet of the 5 mg finasteride which is generic. He will need to use a pill cutter to cut them into quarters.   I will put a prescription(s) for that and he can check with the pharmacy to see what it will cost him.     Please call the patient

## 2019-02-27 ENCOUNTER — TRANSFERRED RECORDS (OUTPATIENT)
Dept: HEALTH INFORMATION MANAGEMENT | Facility: CLINIC | Age: 64
End: 2019-02-27

## 2019-03-09 ENCOUNTER — TRANSFERRED RECORDS (OUTPATIENT)
Dept: HEALTH INFORMATION MANAGEMENT | Facility: CLINIC | Age: 64
End: 2019-03-09

## 2019-03-12 DIAGNOSIS — G89.4 CHRONIC PAIN SYNDROME: ICD-10-CM

## 2019-03-12 DIAGNOSIS — G60.9 IDIOPATHIC NEUROPATHY: ICD-10-CM

## 2019-03-12 RX ORDER — TRAMADOL HYDROCHLORIDE 50 MG/1
TABLET ORAL
Qty: 270 TABLET | Refills: 1 | Status: SHIPPED | OUTPATIENT
Start: 2019-03-12

## 2019-03-12 NOTE — TELEPHONE ENCOUNTER
The requested prescription(s) has/have been approved and has/have been printed and signed. This note was forwarded to the patient care pool to contact the patient to arrange for the patient to obtain the signed prescription(s).  Bello Bruno

## 2019-03-12 NOTE — TELEPHONE ENCOUNTER
Patient is followed by SHWETA BRUNO for ongoing prescription of pain medication.  All refills should be approved by this provider, or covering partner.     Medication(s): tramadol .   Maximum quantity per month: 90  Clinic visit frequency required: Q 6  months      Controlled substance agreement on file: Yes 11/30/2016        Pain Clinic evaluation in the past: Yes in 2011 with Dr Gricelda DE LA CRUZ Total Score(s):  No flowsheet data found.     Last [facility] website verification:  3/12/19, printed and given to Dr Bruno.    [email or web address]

## 2019-04-03 ENCOUNTER — TRANSFERRED RECORDS (OUTPATIENT)
Dept: HEALTH INFORMATION MANAGEMENT | Facility: CLINIC | Age: 64
End: 2019-04-03

## 2019-05-14 DIAGNOSIS — G60.9 IDIOPATHIC PERIPHERAL NEUROPATHY: ICD-10-CM

## 2019-05-15 RX ORDER — GABAPENTIN 800 MG/1
TABLET ORAL
Qty: 270 TABLET | Refills: 1 | Status: SHIPPED | OUTPATIENT
Start: 2019-05-15

## 2019-06-08 DIAGNOSIS — N40.1 BENIGN NON-NODULAR PROSTATIC HYPERPLASIA WITH LOWER URINARY TRACT SYMPTOMS: ICD-10-CM

## 2019-06-09 NOTE — TELEPHONE ENCOUNTER
"Requested Prescriptions   Pending Prescriptions Disp Refills     tamsulosin (FLOMAX) 0.4 MG capsule [Pharmacy Med Name: TAMSULOSIN 0.4MG    CAP] 90 capsule 1     Sig: TAKE 1 CAPSULE BY MOUTH ONCE DAILY       Alpha Blockers Failed - 6/8/2019  9:29 AM        Failed - Patient does not have Tadalafil, Vardenafil, or Sildenafil on their medication list        Passed - Blood pressure under 140/90 in past 12 months     BP Readings from Last 3 Encounters:   07/19/18 105/60   07/09/18 119/75   05/22/18 133/84                 Passed - Recent (12 mo) or future (30 days) visit within the authorizing provider's specialty     Patient had office visit in the last 12 months or has a visit in the next 30 days with authorizing provider or within the authorizing provider's specialty.  See \"Patient Info\" tab in inbasket, or \"Choose Columns\" in Meds & Orders section of the refill encounter.              Passed - Medication is active on med list        Passed - Patient is 18 years of age or older        Last Written Prescription Date:  5/9/18  Last Fill Quantity: 90,  # refills: 3   Last office visit: 7/19/2018 with prescribing provider:     Future Office Visit:      "

## 2019-06-10 RX ORDER — TAMSULOSIN HYDROCHLORIDE 0.4 MG/1
CAPSULE ORAL
Qty: 90 CAPSULE | Refills: 0 | Status: ON HOLD | OUTPATIENT
Start: 2019-06-10 | End: 2019-06-18

## 2019-06-17 ENCOUNTER — ANESTHESIA (OUTPATIENT)
Dept: SURGERY | Facility: CLINIC | Age: 64
End: 2019-06-17
Payer: COMMERCIAL

## 2019-06-17 ENCOUNTER — HOSPITAL ENCOUNTER (OUTPATIENT)
Facility: CLINIC | Age: 64
Discharge: HOME | End: 2019-06-18
Attending: UROLOGY | Admitting: UROLOGY
Payer: COMMERCIAL

## 2019-06-17 ENCOUNTER — ANESTHESIA EVENT (OUTPATIENT)
Dept: SURGERY | Facility: CLINIC | Age: 64
End: 2019-06-17
Payer: COMMERCIAL

## 2019-06-17 DIAGNOSIS — C61 CA OF PROSTATE (H): Primary | ICD-10-CM

## 2019-06-17 PROCEDURE — 27210794 ZZH OR GENERAL SUPPLY STERILE: Performed by: UROLOGY

## 2019-06-17 PROCEDURE — 37000009 ZZH ANESTHESIA TECHNICAL FEE, EACH ADDTL 15 MIN: Performed by: UROLOGY

## 2019-06-17 PROCEDURE — 36000087 ZZH SURGERY LEVEL 8 EA 15 ADDTL MIN: Performed by: UROLOGY

## 2019-06-17 PROCEDURE — 25000125 ZZHC RX 250: Performed by: UROLOGY

## 2019-06-17 PROCEDURE — 25000125 ZZHC RX 250: Performed by: NURSE ANESTHETIST, CERTIFIED REGISTERED

## 2019-06-17 PROCEDURE — 25800030 ZZH RX IP 258 OP 636: Performed by: NURSE ANESTHETIST, CERTIFIED REGISTERED

## 2019-06-17 PROCEDURE — 25000128 H RX IP 250 OP 636: Performed by: UROLOGY

## 2019-06-17 PROCEDURE — 71000012 ZZH RECOVERY PHASE 1 LEVEL 1 FIRST HR: Performed by: UROLOGY

## 2019-06-17 PROCEDURE — 25000128 H RX IP 250 OP 636: Performed by: ANESTHESIOLOGY

## 2019-06-17 PROCEDURE — 12000000 ZZH R&B MED SURG/OB

## 2019-06-17 PROCEDURE — 25000132 ZZH RX MED GY IP 250 OP 250 PS 637: Performed by: UROLOGY

## 2019-06-17 PROCEDURE — 88309 TISSUE EXAM BY PATHOLOGIST: CPT | Mod: 26 | Performed by: UROLOGY

## 2019-06-17 PROCEDURE — 40000169 ZZH STATISTIC PRE-PROCEDURE ASSESSMENT I: Performed by: UROLOGY

## 2019-06-17 PROCEDURE — 37000008 ZZH ANESTHESIA TECHNICAL FEE, 1ST 30 MIN: Performed by: UROLOGY

## 2019-06-17 PROCEDURE — 88309 TISSUE EXAM BY PATHOLOGIST: CPT | Performed by: UROLOGY

## 2019-06-17 PROCEDURE — 36000085 ZZH SURGERY LEVEL 8 1ST 30 MIN: Performed by: UROLOGY

## 2019-06-17 PROCEDURE — 25800025 ZZH RX 258: Performed by: UROLOGY

## 2019-06-17 PROCEDURE — 88307 TISSUE EXAM BY PATHOLOGIST: CPT | Performed by: UROLOGY

## 2019-06-17 PROCEDURE — 88307 TISSUE EXAM BY PATHOLOGIST: CPT | Mod: 26 | Performed by: UROLOGY

## 2019-06-17 PROCEDURE — 71000013 ZZH RECOVERY PHASE 1 LEVEL 1 EA ADDTL HR: Performed by: UROLOGY

## 2019-06-17 PROCEDURE — 25000128 H RX IP 250 OP 636: Performed by: NURSE ANESTHETIST, CERTIFIED REGISTERED

## 2019-06-17 PROCEDURE — 25000566 ZZH SEVOFLURANE, EA 15 MIN: Performed by: UROLOGY

## 2019-06-17 RX ORDER — CEFAZOLIN SODIUM 2 G/100ML
2 INJECTION, SOLUTION INTRAVENOUS
Status: COMPLETED | OUTPATIENT
Start: 2019-06-17 | End: 2019-06-17

## 2019-06-17 RX ORDER — SODIUM CHLORIDE, SODIUM LACTATE, POTASSIUM CHLORIDE, CALCIUM CHLORIDE 600; 310; 30; 20 MG/100ML; MG/100ML; MG/100ML; MG/100ML
INJECTION, SOLUTION INTRAVENOUS CONTINUOUS PRN
Status: DISCONTINUED | OUTPATIENT
Start: 2019-06-17 | End: 2019-06-17

## 2019-06-17 RX ORDER — ONDANSETRON 2 MG/ML
INJECTION INTRAMUSCULAR; INTRAVENOUS PRN
Status: DISCONTINUED | OUTPATIENT
Start: 2019-06-17 | End: 2019-06-17

## 2019-06-17 RX ORDER — FENTANYL CITRATE 50 UG/ML
25-50 INJECTION, SOLUTION INTRAMUSCULAR; INTRAVENOUS
Status: DISCONTINUED | OUTPATIENT
Start: 2019-06-17 | End: 2019-06-17 | Stop reason: HOSPADM

## 2019-06-17 RX ORDER — GABAPENTIN 800 MG/1
800 TABLET ORAL DAILY
Status: DISCONTINUED | OUTPATIENT
Start: 2019-06-17 | End: 2019-06-18 | Stop reason: HOSPADM

## 2019-06-17 RX ORDER — ONDANSETRON 4 MG/1
4 TABLET, ORALLY DISINTEGRATING ORAL EVERY 30 MIN PRN
Status: DISCONTINUED | OUTPATIENT
Start: 2019-06-17 | End: 2019-06-17 | Stop reason: HOSPADM

## 2019-06-17 RX ORDER — FENTANYL CITRATE 50 UG/ML
INJECTION, SOLUTION INTRAMUSCULAR; INTRAVENOUS PRN
Status: DISCONTINUED | OUTPATIENT
Start: 2019-06-17 | End: 2019-06-17

## 2019-06-17 RX ORDER — CEFAZOLIN SODIUM 1 G/3ML
1 INJECTION, POWDER, FOR SOLUTION INTRAMUSCULAR; INTRAVENOUS EVERY 8 HOURS
Status: DISCONTINUED | OUTPATIENT
Start: 2019-06-17 | End: 2019-06-18 | Stop reason: HOSPADM

## 2019-06-17 RX ORDER — LIDOCAINE HYDROCHLORIDE 20 MG/ML
INJECTION, SOLUTION INFILTRATION; PERINEURAL PRN
Status: DISCONTINUED | OUTPATIENT
Start: 2019-06-17 | End: 2019-06-17

## 2019-06-17 RX ORDER — SODIUM CHLORIDE, SODIUM LACTATE, POTASSIUM CHLORIDE, CALCIUM CHLORIDE 600; 310; 30; 20 MG/100ML; MG/100ML; MG/100ML; MG/100ML
INJECTION, SOLUTION INTRAVENOUS CONTINUOUS
Status: DISCONTINUED | OUTPATIENT
Start: 2019-06-17 | End: 2019-06-17 | Stop reason: HOSPADM

## 2019-06-17 RX ORDER — MAGNESIUM HYDROXIDE 1200 MG/15ML
LIQUID ORAL PRN
Status: DISCONTINUED | OUTPATIENT
Start: 2019-06-17 | End: 2019-06-17 | Stop reason: HOSPADM

## 2019-06-17 RX ORDER — ONDANSETRON 4 MG/1
4 TABLET, ORALLY DISINTEGRATING ORAL EVERY 6 HOURS PRN
Status: DISCONTINUED | OUTPATIENT
Start: 2019-06-17 | End: 2019-06-18 | Stop reason: HOSPADM

## 2019-06-17 RX ORDER — PROPOFOL 10 MG/ML
INJECTION, EMULSION INTRAVENOUS PRN
Status: DISCONTINUED | OUTPATIENT
Start: 2019-06-17 | End: 2019-06-17

## 2019-06-17 RX ORDER — ONDANSETRON 2 MG/ML
4 INJECTION INTRAMUSCULAR; INTRAVENOUS EVERY 6 HOURS PRN
Status: DISCONTINUED | OUTPATIENT
Start: 2019-06-17 | End: 2019-06-18 | Stop reason: HOSPADM

## 2019-06-17 RX ORDER — NALOXONE HYDROCHLORIDE 0.4 MG/ML
.1-.4 INJECTION, SOLUTION INTRAMUSCULAR; INTRAVENOUS; SUBCUTANEOUS
Status: DISCONTINUED | OUTPATIENT
Start: 2019-06-17 | End: 2019-06-17

## 2019-06-17 RX ORDER — LEVOTHYROXINE SODIUM 100 UG/1
100 TABLET ORAL ONCE
Status: COMPLETED | OUTPATIENT
Start: 2019-06-17 | End: 2019-06-17

## 2019-06-17 RX ORDER — ONDANSETRON 2 MG/ML
4 INJECTION INTRAMUSCULAR; INTRAVENOUS EVERY 30 MIN PRN
Status: DISCONTINUED | OUTPATIENT
Start: 2019-06-17 | End: 2019-06-17 | Stop reason: HOSPADM

## 2019-06-17 RX ORDER — NEOSTIGMINE METHYLSULFATE 1 MG/ML
VIAL (ML) INJECTION PRN
Status: DISCONTINUED | OUTPATIENT
Start: 2019-06-17 | End: 2019-06-17

## 2019-06-17 RX ORDER — EPHEDRINE SULFATE 50 MG/ML
INJECTION, SOLUTION INTRAMUSCULAR; INTRAVENOUS; SUBCUTANEOUS PRN
Status: DISCONTINUED | OUTPATIENT
Start: 2019-06-17 | End: 2019-06-17

## 2019-06-17 RX ORDER — PROPOFOL 10 MG/ML
INJECTION, EMULSION INTRAVENOUS CONTINUOUS PRN
Status: DISCONTINUED | OUTPATIENT
Start: 2019-06-17 | End: 2019-06-17

## 2019-06-17 RX ORDER — DEXAMETHASONE SODIUM PHOSPHATE 4 MG/ML
INJECTION, SOLUTION INTRA-ARTICULAR; INTRALESIONAL; INTRAMUSCULAR; INTRAVENOUS; SOFT TISSUE PRN
Status: DISCONTINUED | OUTPATIENT
Start: 2019-06-17 | End: 2019-06-17

## 2019-06-17 RX ORDER — NEOMYCIN/BACITRACIN/POLYMYXINB 3.5-400-5K
OINTMENT (GRAM) TOPICAL 4 TIMES DAILY PRN
Status: DISCONTINUED | OUTPATIENT
Start: 2019-06-17 | End: 2019-06-18 | Stop reason: HOSPADM

## 2019-06-17 RX ORDER — NALOXONE HYDROCHLORIDE 0.4 MG/ML
.1-.4 INJECTION, SOLUTION INTRAMUSCULAR; INTRAVENOUS; SUBCUTANEOUS
Status: DISCONTINUED | OUTPATIENT
Start: 2019-06-17 | End: 2019-06-18 | Stop reason: HOSPADM

## 2019-06-17 RX ORDER — ATROPA BELLADONNA AND OPIUM 16.2; 3 MG/1; MG/1
SUPPOSITORY RECTAL PRN
Status: DISCONTINUED | OUTPATIENT
Start: 2019-06-17 | End: 2019-06-17 | Stop reason: HOSPADM

## 2019-06-17 RX ORDER — HYDROMORPHONE HYDROCHLORIDE 1 MG/ML
.3-.5 INJECTION, SOLUTION INTRAMUSCULAR; INTRAVENOUS; SUBCUTANEOUS EVERY 5 MIN PRN
Status: DISCONTINUED | OUTPATIENT
Start: 2019-06-17 | End: 2019-06-17 | Stop reason: HOSPADM

## 2019-06-17 RX ORDER — OXYCODONE AND ACETAMINOPHEN 5; 325 MG/1; MG/1
1-2 TABLET ORAL EVERY 4 HOURS PRN
Status: DISCONTINUED | OUTPATIENT
Start: 2019-06-17 | End: 2019-06-18 | Stop reason: HOSPADM

## 2019-06-17 RX ORDER — LIDOCAINE 40 MG/G
CREAM TOPICAL
Status: DISCONTINUED | OUTPATIENT
Start: 2019-06-17 | End: 2019-06-18 | Stop reason: HOSPADM

## 2019-06-17 RX ORDER — GLYCOPYRROLATE 0.2 MG/ML
INJECTION, SOLUTION INTRAMUSCULAR; INTRAVENOUS PRN
Status: DISCONTINUED | OUTPATIENT
Start: 2019-06-17 | End: 2019-06-17

## 2019-06-17 RX ADMIN — PROPOFOL 75 MCG/KG/MIN: 10 INJECTION, EMULSION INTRAVENOUS at 10:20

## 2019-06-17 RX ADMIN — ROCURONIUM BROMIDE 30 MG: 10 INJECTION INTRAVENOUS at 10:51

## 2019-06-17 RX ADMIN — DEXTROSE AND SODIUM CHLORIDE: 5; 450 INJECTION, SOLUTION INTRAVENOUS at 23:30

## 2019-06-17 RX ADMIN — GABAPENTIN 800 MG: 800 TABLET, FILM COATED ORAL at 17:50

## 2019-06-17 RX ADMIN — PROPOFOL 200 MG: 10 INJECTION, EMULSION INTRAVENOUS at 10:02

## 2019-06-17 RX ADMIN — HYDROMORPHONE HYDROCHLORIDE 0.5 MG: 1 INJECTION, SOLUTION INTRAMUSCULAR; INTRAVENOUS; SUBCUTANEOUS at 14:26

## 2019-06-17 RX ADMIN — ROCURONIUM BROMIDE 10 MG: 10 INJECTION INTRAVENOUS at 12:02

## 2019-06-17 RX ADMIN — ONDANSETRON 4 MG: 2 INJECTION INTRAMUSCULAR; INTRAVENOUS at 12:56

## 2019-06-17 RX ADMIN — ROCURONIUM BROMIDE 10 MG: 10 INJECTION INTRAVENOUS at 12:25

## 2019-06-17 RX ADMIN — NEOSTIGMINE METHYLSULFATE 3 MG: 1 INJECTION, SOLUTION INTRAVENOUS at 13:16

## 2019-06-17 RX ADMIN — LEVOTHYROXINE SODIUM 100 MCG: 100 TABLET ORAL at 17:50

## 2019-06-17 RX ADMIN — CEFAZOLIN SODIUM 2 G: 2 INJECTION, SOLUTION INTRAVENOUS at 10:24

## 2019-06-17 RX ADMIN — OXYCODONE HYDROCHLORIDE AND ACETAMINOPHEN 1 TABLET: 5; 325 TABLET ORAL at 22:16

## 2019-06-17 RX ADMIN — MIDAZOLAM 2 MG: 1 INJECTION INTRAMUSCULAR; INTRAVENOUS at 09:54

## 2019-06-17 RX ADMIN — Medication 10 MG: at 10:41

## 2019-06-17 RX ADMIN — GLYCOPYRROLATE 0.4 MG: 0.2 INJECTION, SOLUTION INTRAMUSCULAR; INTRAVENOUS at 13:16

## 2019-06-17 RX ADMIN — DEXAMETHASONE SODIUM PHOSPHATE 8 MG: 4 INJECTION, SOLUTION INTRA-ARTICULAR; INTRALESIONAL; INTRAMUSCULAR; INTRAVENOUS; SOFT TISSUE at 10:24

## 2019-06-17 RX ADMIN — SODIUM CHLORIDE, POTASSIUM CHLORIDE, SODIUM LACTATE AND CALCIUM CHLORIDE: 600; 310; 30; 20 INJECTION, SOLUTION INTRAVENOUS at 09:54

## 2019-06-17 RX ADMIN — ROCURONIUM BROMIDE 50 MG: 10 INJECTION INTRAVENOUS at 10:02

## 2019-06-17 RX ADMIN — FENTANYL CITRATE 100 MCG: 50 INJECTION, SOLUTION INTRAMUSCULAR; INTRAVENOUS at 10:02

## 2019-06-17 RX ADMIN — Medication 5 MG: at 10:33

## 2019-06-17 RX ADMIN — Medication 8 MCG: at 12:58

## 2019-06-17 RX ADMIN — GLYCOPYRROLATE 0.2 MG: 0.2 INJECTION, SOLUTION INTRAMUSCULAR; INTRAVENOUS at 10:24

## 2019-06-17 RX ADMIN — DEXTROSE AND SODIUM CHLORIDE: 5; 450 INJECTION, SOLUTION INTRAVENOUS at 16:16

## 2019-06-17 RX ADMIN — OXYCODONE HYDROCHLORIDE AND ACETAMINOPHEN 1 TABLET: 5; 325 TABLET ORAL at 17:58

## 2019-06-17 RX ADMIN — LIDOCAINE HYDROCHLORIDE 80 MG: 20 INJECTION, SOLUTION INFILTRATION; PERINEURAL at 10:02

## 2019-06-17 RX ADMIN — SODIUM CHLORIDE, POTASSIUM CHLORIDE, SODIUM LACTATE AND CALCIUM CHLORIDE: 600; 310; 30; 20 INJECTION, SOLUTION INTRAVENOUS at 11:34

## 2019-06-17 RX ADMIN — CEFAZOLIN 1 G: 1 INJECTION, POWDER, FOR SOLUTION INTRAMUSCULAR; INTRAVENOUS at 17:50

## 2019-06-17 RX ADMIN — BUPIVACAINE HYDROCHLORIDE 60 ML GIVEN: 2.5 INJECTION, SOLUTION EPIDURAL; INFILTRATION; INTRACAUDAL at 14:41

## 2019-06-17 RX ADMIN — ROCURONIUM BROMIDE 10 MG: 10 INJECTION INTRAVENOUS at 11:35

## 2019-06-17 ASSESSMENT — ACTIVITIES OF DAILY LIVING (ADL)
SWALLOWING: 0-->SWALLOWS FOODS/LIQUIDS WITHOUT DIFFICULTY
TOILETING: 0-->INDEPENDENT
RETIRED_COMMUNICATION: 0-->UNDERSTANDS/COMMUNICATES WITHOUT DIFFICULTY
FALL_HISTORY_WITHIN_LAST_SIX_MONTHS: NO
AMBULATION: 0-->INDEPENDENT
RETIRED_EATING: 0-->INDEPENDENT
BATHING: 0-->INDEPENDENT
ADLS_ACUITY_SCORE: 10
TRANSFERRING: 0-->INDEPENDENT
DRESS: 0-->INDEPENDENT
ADLS_ACUITY_SCORE: 12
COGNITION: 0 - NO COGNITION ISSUES REPORTED

## 2019-06-17 ASSESSMENT — ENCOUNTER SYMPTOMS: SEIZURES: 0

## 2019-06-17 ASSESSMENT — LIFESTYLE VARIABLES: TOBACCO_USE: 0

## 2019-06-17 ASSESSMENT — MIFFLIN-ST. JEOR
SCORE: 1703
SCORE: 1698.01

## 2019-06-17 NOTE — OP NOTE
[facility] Brief Operative Note    Pre-operative diagnosis: PROSTATE CANCER   Post-operative diagnosis: Same   Procedure: Procedure(s):  ROBOT ASSISTED RADICAL PROSTATECTOMY, RIGHT PELVIC LYMPH NODE DISSECTION   Surgeon: Keenan Ricketts MD   Assistant(s): Odette Cota   Anesthesia: General endotracheal anesthesia   Estimated blood loss: Less than 50 ml   Total IV fluids: (See anesthesia record)   Blood transfusion: No transfusion was given during surgery   Total urine output: (See anesthesia record)   Drains: Aj-Rainey   Specimens: Prostate and rt pelvic lymph nodes   Implants: None   Findings: Prostate ca   Complications: None   Condition: Stable   Comments: See dictated operative report for full details

## 2019-06-17 NOTE — ANESTHESIA POSTPROCEDURE EVALUATION
Patient: Navneet Toussaint    Procedure(s):  ROBOT ASSISTED RADICAL PROSTATECTOMY, RIGHT PELVIC LYMPH NODE DISSECTION    Diagnosis:PROSTATE CANCER  Diagnosis Additional Information: No value filed.    Anesthesia Type:  General, ETT    Note:  Anesthesia Post Evaluation    Patient location during evaluation: PACU  Patient participation: Able to fully participate in evaluation  Level of consciousness: awake, awake and alert and responsive to verbal stimuli  Pain management: adequate  Airway patency: patent  Cardiovascular status: acceptable  Respiratory status: acceptable  Hydration status: acceptable  PONV: none     Anesthetic complications: None          Last vitals:  Vitals:    06/17/19 1522 06/17/19 1538 06/17/19 1618   BP: 141/57  142/76   Pulse:   58   Resp: 18 19 14   Temp: 36.6  C (97.8  F)  35.2  C (95.3  F)   SpO2: 96%  98%         Electronically Signed By: Jeanette Anderson  June 17, 2019  4:41 PM

## 2019-06-17 NOTE — PROCEDURES
Post-operative Note    Preoperative Diagnosis:  Prostate Cancer  Postoperative Diagnosis:  Prostate Cancer    Procedure:  Robot Assisted Radical Prostatectomy with pelvic lymphadenectomy        Surgeon(s):  Keenan Ricketts MD  Assistant Surgeon(s): Odette Cota CST  Date of Procedure: 6/17/2019    PSA: 5.4  PROSTATE VOLUME:30 cc  PATHOLOGY:  Adenocarcinoma, Critz grade 7  Right Mammoth: 3/4  Right Mid:   Right Base:   Left Mammoth:   Left Mid:   Left Base:          POTENCY SPARING:  Bilateral  RISK FACTORS:   Body mass index is 26.01 kg/m .     PORT PLACEMENT:  Standard 6 ports     After discussing all treatment options, the patient elected to proceed with robotic prostatectomy.  The patient understands that we will proceed with robotic prostatectomy, but will convert to open prostatectomy if needed.    DESCRIPTION OF PROCEDURE:    The patient was identified and was brougt to the operating room.  Hewas placed on a Gelfoam padded table.  After adequate general anesthesia, he was placed in low stirrups.  Pneumatic compression stockings had been applied.  All the pressure points were adequately padded.  Shoulder braces were used, these were appropriately positioned not to cause any pressure.  The patient was then prepped and draped in the usual manner.  Time out was called.  An 18 Indonesian cason catheter was placed with in the sterile field and the bladder was emptied.  I made a small supra umbilical incision.  Stay sutures were placed on the fascia.  Using a Veress needle, pneumoperitoneum was achieved.  I then placed a 12 mm port at this site.  Initial endoscopic inspection with a 0 degree lens showed findings as noted above.  The remaining ports were then placed.  Two 8 mm ports were placed on either side 10 cm from the umbilical port.  A 12 mm port was placed in the right lower quadrant above the anterior superior iliac spine, and a similar location on the left side an 8 mm port was placed.  A 8 mm airseal/suction  port was placed lateral to the camera port on the right side.  With all the ports in place the patient was placed in steep trendelenburg position and the robot was docked.    I performed the robotic prostatectomy in my standard fashion.  4 arms were used.  The surgery was performed using a 0 degree lens.  Guarded scissors in arm 1, PK coagulator in arm 2 and prograsp in arm 3.  I first incised the peritonium at the bladder base.  The seminal vesicles and vas deferens were identified and dissected free and the plane between the prostate and rectum was identified and seperated as distal and lateral as possible.  Next peritoneal incisions were made lateral to the medial umbilical ligaments and the bladder was dissected away from the anterior abdominal wall and pubic ramus to expose the prostate.  The superficial dorsal vein was cauterized and transected.  The endopelvic fascia was opened.  I then proceeded to dissect the tissue away from the apex of the prostate down to the deep dorsal vein complex.  I then placed a 0 Vicryl suture on a CT-1 needle to ligate the deep dorsal complex.      Next the anterior bladder wall was incised at the level of the bladder neck.  The cason catheter was extracted from the bladder and placed to traction using the third arm.  The posterior bladder neck was then excised and dissected away from the base of the prostate.  The previously dissected seminal vesicles and vas deferens were now brought into view.  These structures were then tented up.  The plane between the prostate and the rectum was further dissected up to the apex of the prostate.  This exposed the lateral pedicles.      I then proceeded to identify the plane between the lateral prostate and the lateral pedicles. Vessel sealer was used and the lateral pedicle was/were transected.  Electro Cautery was not used in this area.  Nerve preservation was performed.  The dissection was carried around the apex of the prostate.  Next  I  transected the deep dorsal vein and further dissection was carried around the apex of the prostate.  The urethra was transected distal to the apex of the prostate and the prostate was placed in am Endopouch.  Bishop-Seal was used to achieve further hemostasis.    I than performed pelvic lymphadenectomy. The nodes along the right iliac and obturator chain were removed. Clinically they were not enlarged and appeared benign. They were sent for permanent pathologic analysis.    Next, I proceeded to anastomosis the bladder neck to the urethra using a running suture of 3-0 Monocryl on a double armed needle.  A two layer anastomosis was performed posteriorly.  A 20 Bengali cason catheter was placed and the bladder irrigated well.  A good watertight , tension free anastasmosis was obtained.  Further inspection at this time showed no further bleeding.  The string of the Endopouch was then brought out through the umbilical port site.   The robot was then docked away.  A 15 Bengali round Aj Rainey was placed through the left lower quadrant port and all the laparoscopic ports were removed.  I slightly extended the supraumbilical incision and the prostate was removed through this site.  The fascia was then closed using Vicryl sutures.  The skin incisions were closed using staples.  The Ja Rainey was secured to the skin with a silk stitch.  The needle, sponge and lap counts were correct.  The patient remained stable throughout the entire procedure.  The estimated blood loss was  . The patient tolerated the procedure well and was sent to the recovery room in stable condition.

## 2019-06-17 NOTE — PROGRESS NOTES
Admission medication history interview status for the 6/17/2019  admission is complete. See EPIC admission navigator for prior to admission medications     Medication history source reliability:Good    Medication history interview source(s):Patient    Medication history resources (including written lists, pill bottles, clinic record):None    Primary [email or web address]    Additional medication history information not noted on PTA med list :none    Time spent in this activity: 40 minutes    Prior to Admission medications    Medication Sig Last Dose Taking? Auth Provider   finasteride (PROSCAR) 5 MG tablet Take 1/4 of a tablet daily 6/16/2019 at 1200 Yes Bello Bruno MD   gabapentin (NEURONTIN) 800 MG tablet TAKE 1 TABLET BY MOUTH THREE TIMES DAILY 6/17/2019 at 0300 Yes Bello Bruno MD   KRILL OIL PO Take 1 tablet by mouth 6/3/2019 at 0700 Yes Reported, Patient   levothyroxine (SYNTHROID/LEVOTHROID) 112 MCG tablet TAKE 1 TABLET (112 MCG) BY MOUTH DAILY 6/16/2019 at 1200 Yes Bello Bruno MD   tadalafil (CIALIS) 20 MG tablet TAKE 1 TABLET (20 MG) BY MOUTH DAILY AS NEEDED FOR ERECTILE DYSFUNCTION 6/14/2019 at 2000 Yes Bello Bruno MD   tamsulosin (FLOMAX) 0.4 MG capsule TAKE 1 CAPSULE BY MOUTH ONCE DAILY 6/16/2019 at 1200 Yes Bello Bruno MD   traMADol (ULTRAM) 50 MG tablet TAKE 1 TO 2 TABLETS BY MOUTH EVERY 4 HOURS AS NEEDED .  MAX  OF  8  TABLETS  PER  DAY 6/17/2019 at 0300 Yes Bello Bruno MD

## 2019-06-17 NOTE — ANESTHESIA PROCEDURE NOTES
Peripheral nerve/Neuraxial procedure note : TAP  Pre-Procedure  Performed by Mg Joyner DO  Location: pre-op      Pre-Anesthestic Checklist: patient identified, IV checked, site marked, risks and benefits discussed, informed consent, monitors and equipment checked, pre-op evaluation, at physician/surgeon's request and post-op pain management    Timeout  Correct Patient: Yes   Correct Procedure: Yes   Correct Site: Yes   Correct Laterality: Yes   Correct Position: Yes   Site Marked: Yes   .   Procedure Documentation    .    Procedure:  bilateral  TAP.  Local skin infiltrated with 3 mL of 1% lidocaine.     Ultrasound used to identify targeted nerve, plexus, or vascular marker and placed a needle adjacent to it., Ultrasound was used to visualize the spread of the anesthetic in close proximity to the above stated nerve. A permanent image is entered into the patient's record.  Patient Prep;mask, sterile gloves, chlorhexidine gluconate and isopropyl alcohol, patient draped.  .  Needle: insulated, short bevel Needle Gauge: 21.    Needle Length (Inches) 4  Insertion Method: Single Shot.       Assessment/Narrative  Paresthesias: No.  .  The placement was negative for: blood aspirated, painful injection and site bleeding.  Bolus given via needle..   Secured via.   Complications: none. Test dose of mL at. Test dose negative for signs of intravascular, subdural or intrathecal injection. Comments:  The surgeon has given a verbal order transferring care of this patient to me for the performance of a regional analgesia block for post-op pain control. It is requested of me because I am uniquely trained and qualified to perform this block and the surgeon is neither trained nor qualified to perform this procedure

## 2019-06-17 NOTE — ANESTHESIA CARE TRANSFER NOTE
Patient: Navneet Toussaint    Procedure(s):  ROBOT ASSISTED RADICAL PROSTATECTOMY, RIGHT PELVIC LYMPH NODE DISSECTION    Diagnosis: PROSTATE CANCER  Diagnosis Additional Information: No value filed.    Anesthesia Type:   General, ETT     Note:  Airway :Face Mask  Patient transferred to:PACU  Handoff Report: Identifed the Patient, Identified the Reponsible Provider, Reviewed the pertinent medical history, Discussed the surgical course, Reviewed Intra-OP anesthesia mangement and issues during anesthesia, Set expectations for post-procedure period and Allowed opportunity for questions and acknowledgement of understanding      Vitals: (Last set prior to Anesthesia Care Transfer)    CRNA VITALS  6/17/2019 1309 - 6/17/2019 1349      6/17/2019             Pulse:  65    SpO2:  100 %    Resp Rate (observed):  6  (Abnormal)                 Electronically Signed By: PARKER Noble CRNA  June 17, 2019  1:49 PM

## 2019-06-17 NOTE — ANESTHESIA PREPROCEDURE EVALUATION
Anesthesia Pre-Procedure Evaluation    Patient: Navneet Toussaint   MRN: [id number] : 1955          Preoperative Diagnosis: PROSTATE CANCER    Procedure(s):  DAVINCI XI ROBOTIC ASSISTED RADICAL PROSTATECTOMY (GENERAL AND TAP ANESTHESIA)    Past Medical History:   Diagnosis Date     Cataract, mod, od; mild-mod, os 2015     Hypothyroid      Long term (current) use of opiate analgesic      Peripheral neuropathy      Prostate cancer (H), adenocarcinoma 2018     Sleep apnea     doesnt use CPAP machine      Social anxiety disorder      Past Surgical History:   Procedure Laterality Date     BIOPSY  2015    prostate biopsy     CATARACT IOL, RT/LT Right 2015     [facility] SURGICAL PATHOLOGY       COLONOSCOPY       COLONOSCOPY WITH CO2 INSUFFLATION N/A 2017    Procedure: COLONOSCOPY WITH CO2 INSUFFLATION;  COLON SCREEN/ SYPURA;  Surgeon: Flex Jones MD;  Location: [facility] OR     PHACOEMULSIFICATION WITH STANDARD INTRAOCULAR LENS IMPLANT  2018    left eye     VASECTOMY         Anesthesia Evaluation     . Pt has had prior anesthetic.     No history of anesthetic complications          ROS/MED HX    ENT/Pulmonary:     (+)sleep apnea, uses CPAP , . .   (-) tobacco use and asthma   Neurologic:     (+)neuropathy    (-) seizures and CVA   Cardiovascular:         METS/Exercise Tolerance:     Hematologic:         Musculoskeletal:         GI/Hepatic:        (-) GERD and liver disease   Renal/Genitourinary:      (-) renal disease   Endo:     (+) thyroid problem hypothyroidism, .      Psychiatric:     (+) psychiatric history anxiety      Infectious Disease:         Malignancy:         Other:                          Physical Exam  Normal systems: dental    Airway   Mallampati: II  TM distance: >3 FB  Neck ROM: full    Dental     Cardiovascular   Rhythm and rate: regular      Pulmonary    breath sounds clear to auscultation            Lab Results   Component Value Date    WBC 6.5 2011     HGB 15.4 04/06/2011    HCT 44.5 04/06/2011     04/06/2011    CRP 1.8 04/06/2011    SED 8 05/16/2017     10/09/2015    POTASSIUM 4.4 10/09/2015    CHLORIDE 106 10/09/2015    CO2 28 10/09/2015    BUN 17 10/09/2015    CR 1.03 10/09/2015     (H) 10/09/2015    LIZ 8.6 10/09/2015    PHOS 3.4 04/26/2013    ALBUMIN 4.1 10/09/2015    PROTTOTAL 7.1 10/09/2015    ALT 31 10/09/2015    AST 20 10/09/2015    ALKPHOS 75 10/09/2015    BILITOTAL 0.5 10/09/2015    TSH 2.41 07/12/2018    T4 0.89 03/04/2011       Preop Vitals  BP Readings from Last 3 Encounters:   06/17/19 135/58   07/19/18 105/60   07/09/18 119/75    Pulse Readings from Last 3 Encounters:   07/19/18 55   07/09/18 63   05/22/18 71      Resp Readings from Last 3 Encounters:   06/17/19 16   07/19/18 18   05/22/18 14    SpO2 Readings from Last 3 Encounters:   06/17/19 96%   07/19/18 96%   07/09/18 95%      Temp Readings from Last 1 Encounters:   06/17/19 36.3  C (97.4  F)    Ht Readings from Last 1 Encounters:   06/17/19 1.829 m (6')      Wt Readings from Last 1 Encounters:   06/17/19 86.5 kg (190 lb 11.2 oz)    Estimated body mass index is 25.86 kg/m  as calculated from the following:    Height as of this encounter: 1.829 m (6').    Weight as of this encounter: 86.5 kg (190 lb 11.2 oz).       Anesthesia Plan      History & Physical Review  History and physical reviewed and following examination; no interval change.    ASA Status:  2 .    NPO Status:  > 8 hours    Plan for General and ETT   PONV prophylaxis:  Ondansetron (or other 5HT-3) and Dexamethasone or Solumedrol       Postoperative Care      Consents  Anesthetic plan, risks, benefits and alternatives discussed with:  Patient..                 Jeanette Anderson

## 2019-06-18 VITALS
OXYGEN SATURATION: 94 % | RESPIRATION RATE: 16 BRPM | TEMPERATURE: 97.7 F | DIASTOLIC BLOOD PRESSURE: 46 MMHG | BODY MASS INDEX: 25.65 KG/M2 | SYSTOLIC BLOOD PRESSURE: 108 MMHG | HEART RATE: 58 BPM | WEIGHT: 189.4 LBS | HEIGHT: 72 IN

## 2019-06-18 LAB — GLUCOSE BLDC GLUCOMTR-MCNC: 110 MG/DL (ref 70–99)

## 2019-06-18 PROCEDURE — 82962 GLUCOSE BLOOD TEST: CPT

## 2019-06-18 PROCEDURE — 25000132 ZZH RX MED GY IP 250 OP 250 PS 637: Performed by: UROLOGY

## 2019-06-18 PROCEDURE — 25000128 H RX IP 250 OP 636: Performed by: UROLOGY

## 2019-06-18 RX ORDER — SENNA AND DOCUSATE SODIUM 50; 8.6 MG/1; MG/1
1-2 TABLET, FILM COATED ORAL 2 TIMES DAILY
Qty: 60 TABLET | Refills: 0 | Status: SHIPPED | OUTPATIENT
Start: 2019-06-18 | End: 2019-10-09

## 2019-06-18 RX ORDER — OXYCODONE AND ACETAMINOPHEN 5; 325 MG/1; MG/1
1 TABLET ORAL EVERY 6 HOURS PRN
Qty: 12 TABLET | Refills: 0 | Status: SHIPPED | OUTPATIENT
Start: 2019-06-18 | End: 2019-10-09

## 2019-06-18 RX ADMIN — OXYCODONE HYDROCHLORIDE AND ACETAMINOPHEN 1 TABLET: 5; 325 TABLET ORAL at 02:15

## 2019-06-18 RX ADMIN — CEFAZOLIN 1 G: 1 INJECTION, POWDER, FOR SOLUTION INTRAMUSCULAR; INTRAVENOUS at 09:04

## 2019-06-18 RX ADMIN — GABAPENTIN 800 MG: 800 TABLET, FILM COATED ORAL at 08:14

## 2019-06-18 RX ADMIN — OXYCODONE HYDROCHLORIDE AND ACETAMINOPHEN 1 TABLET: 5; 325 TABLET ORAL at 06:38

## 2019-06-18 RX ADMIN — CEFAZOLIN 1 G: 1 INJECTION, POWDER, FOR SOLUTION INTRAMUSCULAR; INTRAVENOUS at 02:14

## 2019-06-18 RX ADMIN — OXYCODONE HYDROCHLORIDE AND ACETAMINOPHEN 1 TABLET: 5; 325 TABLET ORAL at 13:46

## 2019-06-18 ASSESSMENT — MIFFLIN-ST. JEOR: SCORE: 1692.11

## 2019-06-18 ASSESSMENT — ACTIVITIES OF DAILY LIVING (ADL)
ADLS_ACUITY_SCORE: 13

## 2019-06-18 NOTE — PROGRESS NOTES
[facility]    Urology Progress Note     Assessment & Plan   Navneet Toussaint is a 63 year old male who was admitted on 6/17/2019. POD #1 s/p RALP with Dr. Ricketts-- doing well     Plan:   -Increase ambulation this AM   -FLD until flatus, flatus not a requirement for discharge   -RN to teach cason cares   -RUBINA to be removed prior to discharge   -Plan for discharge home this afternoon with leg bag   -Discussed activity restrictions, pain control, diet, incisional cares for discharge   -Dr Ricketts to call pt with pathology results once available     Chelsey Petersen PA-C  [facility]   [email or web address]  Text Page (7:30am to 4:30pm)    Interval History   No issues overnight   Some abdominal and incisional pain this AM but otherwise comfortable   marizol FLD, no flatus yet   Urine clear   Ambulating well     AVSS  UOP 3575/1225, clear   RUBINA 35cc, serosang     Physical Exam   Temp: 97.7  F (36.5  C) Temp src: Oral BP: 108/46 Pulse: 58 Heart Rate: 51 Resp: 16 SpO2: 94 % O2 Device: None (Room air) Oxygen Delivery: 2 LPM  Vitals:    06/17/19 0901 06/17/19 1522 06/18/19 0623   Weight: 86.5 kg (190 lb 11.2 oz) 87 kg (191 lb 12.8 oz) 85.9 kg (189 lb 6.4 oz)     Vital Signs with Ranges  Temp:  [94.3  F (34.6  C)-97.8  F (36.6  C)] 97.7  F (36.5  C)  Pulse:  [56-59] 58  Heart Rate:  [51-60] 51  Resp:  [10-19] 16  BP: ()/(46-76) 108/46  SpO2:  [94 %-100 %] 94 %  I/O last 3 completed shifts:  In: 5006 [P.O.:1160; I.V.:3846]  Out: 3660 [Urine:3575; Drains:35; Blood:50]    Sitting up in bed, NAD  Breathing unlabored   abd soft, appropriately tender, mildly distended  Incisions cdi with staples, dressings removed   RUBINA serosang   Cason with clear UOP   No le edema   A&Ox3    Medications     dextrose 5% and 0.45% NaCl 125 mL/hr at 06/17/19 2330       ceFAZolin  1 g Intravenous Q8H     gabapentin  800 mg Oral Daily     sodium chloride (PF)  3 mL Intracatheter Q8H       Data   Results for  orders placed or performed during the hospital encounter of 06/17/19 (from the past 24 hour(s))   Glucose by meter   Result Value Ref Range    Glucose 110 (H) 70 - 99 mg/dL

## 2019-06-18 NOTE — PLAN OF CARE
A&Ox4. VSS on RA, capno WDL. C/o moderate incisional pain, given Percocet. Full liquid, tolerating. RUBINA patent, dressing CDI. Lap sites WDL, CDI. PIV infusing. Plan: discharge tomorrow w/ cason.

## 2019-06-18 NOTE — PLAN OF CARE
Pt arrived to unit from PACU @ 1500. A&O. VSS on RA ex mojgan at times, capno WDL. Has home CPAP. IS taught, tolerating well. Pain at incisional site, increased with movement, PRN percocet given x1, ice applied. Kwok patent, good UOP. RUBINA with moderate bloody OP, dressing saturated, reinforced. 5 port site, bandaids CDI. 1 small midline CDI. PIV infusing D5 1/2 NS. Clear liquids, tolerating well, denies nausea. Negative for flatus, BS hypoactive. Ambulated in room x1. Plan to possibly discharge in AM.

## 2019-06-19 LAB — COPATH REPORT: NORMAL

## 2019-08-17 DIAGNOSIS — N52.9 ERECTILE DYSFUNCTION, UNSPECIFIED ERECTILE DYSFUNCTION TYPE: ICD-10-CM

## 2019-08-19 RX ORDER — TADALAFIL 20 MG
TABLET ORAL
Qty: 10 TABLET | Refills: 10 | Status: SHIPPED | OUTPATIENT
Start: 2019-08-19

## 2019-09-09 ENCOUNTER — DOCUMENTATION ONLY (OUTPATIENT)
Dept: OPHTHALMOLOGY | Facility: CLINIC | Age: 64
End: 2019-09-09

## 2019-10-09 ENCOUNTER — OFFICE VISIT (OUTPATIENT)
Dept: OPHTHALMOLOGY | Facility: CLINIC | Age: 64
End: 2019-10-09
Payer: COMMERCIAL

## 2019-10-09 ENCOUNTER — TRANSFERRED RECORDS (OUTPATIENT)
Dept: HEALTH INFORMATION MANAGEMENT | Facility: CLINIC | Age: 64
End: 2019-10-09

## 2019-10-09 DIAGNOSIS — H57.8A9 SENSATION OF FOREIGN BODY IN EYE: ICD-10-CM

## 2019-10-09 DIAGNOSIS — Z96.1 PSEUDOPHAKIA: Primary | ICD-10-CM

## 2019-10-09 PROCEDURE — 92014 COMPRE OPH EXAM EST PT 1/>: CPT | Performed by: OPHTHALMOLOGY

## 2019-10-09 ASSESSMENT — EXTERNAL EXAM - LEFT EYE: OS_EXAM: NORMAL

## 2019-10-09 ASSESSMENT — REFRACTION_MANIFEST
OS_AXIS: 179
OS_CYLINDER: +0.50
OS_ADD: +2.50
OS_SPHERE: -0.25

## 2019-10-09 ASSESSMENT — VISUAL ACUITY
OD_SC: 20/20
OS_SC: 20/25
OS_PH_SC: 20/20
METHOD: SNELLEN - LINEAR
OS_SC+: -1
OD_SC+: -1

## 2019-10-09 ASSESSMENT — CUP TO DISC RATIO
OD_RATIO: 0.2
OS_RATIO: 0.2

## 2019-10-09 ASSESSMENT — TONOMETRY
IOP_METHOD: APPLANATION
OD_IOP_MMHG: 16
OS_IOP_MMHG: 15

## 2019-10-09 ASSESSMENT — SLIT LAMP EXAM - LIDS: COMMENTS: 1+ DERMATOCHALASIS - UPPER LID

## 2019-10-09 ASSESSMENT — EXTERNAL EXAM - RIGHT EYE: OD_EXAM: 1+ BROW PTOSIS

## 2019-10-09 NOTE — LETTER
10/9/2019         RE: Navneet Toussaint  [address] [address] [place] [id number]        Dear Colleague,    Thank you for referring your patient, Navneet Toussaint, to the [facility]. Please see a copy of my visit note below.     Current Eye Medications:  Blink both eyes right eye twice a day or three times a day.      Subjective:  Patient complains of cloudy vision in his left eye, especially at night which has been going on for several months (gradually decreasing over time).  He tends to rub his left eye in an attempt to clear his vision.   For the past week, his right eye has had a constant foreign body sensation and redness in his medial canthus area.  Vision in his right eye is good.  He wears over-the-counter readers.    Still symptoms of mild temporal dysphotopsia.       Objective:  See Ophthalmology Exam.       Assessment:  Stable eye exam.  Persistent mild dysphotopsia left eye.  Aberrant lash right lower lid.      ICD-10-CM    1. Pseudophakia, ou Z96.1    2. Sensation of foreign body in eye H57.9         Plan:  Use artificial tears up to 4 times daily both eyes.  (Refresh Tears, Systane Ultra/Balance, or Theratears)  Possible clouding of posterior capsule both eyes discussed.  Possible posterior vitreous detachment (sudden onset large floater and/or flashing lights) both eyes discussed.  Call in June 2020 for an appointment in October 2020 for Complete Exam.    Dr. Miller [phone]    Was going to obtain retinal OCT today but no time - consider in future if symptoms persist or worsen.           Again, thank you for allowing me to participate in the care of your patient.        Sincerely,        Mark Miller MD

## 2019-10-09 NOTE — PROGRESS NOTES
Current Eye Medications:  Blink both eyes right eye twice a day or three times a day.      Subjective:  Patient complains of cloudy vision in his left eye, especially at night which has been going on for several months (gradually decreasing over time).  He tends to rub his left eye in an attempt to clear his vision.   For the past week, his right eye has had a constant foreign body sensation and redness in his medial canthus area.  Vision in his right eye is good.  He wears over-the-counter readers.    Still symptoms of mild temporal dysphotopsia.       Objective:  See Ophthalmology Exam.       Assessment:  Stable eye exam.  Persistent mild dysphotopsia left eye.  Aberrant lash right lower lid.      ICD-10-CM    1. Pseudophakia, ou Z96.1    2. Sensation of foreign body in eye H57.9         Plan:  Use artificial tears up to 4 times daily both eyes.  (Refresh Tears, Systane Ultra/Balance, or Theratears)  Possible clouding of posterior capsule both eyes discussed.  Possible posterior vitreous detachment (sudden onset large floater and/or flashing lights) both eyes discussed.  Call in June 2020 for an appointment in October 2020 for Complete Exam.    Dr. Miller [phone]    Was going to obtain retinal OCT today but no time - consider in future if symptoms persist or worsen.

## 2019-10-09 NOTE — PATIENT INSTRUCTIONS
Use artificial tears up to 4 times daily both eyes.  (Refresh Tears, Systane Ultra/Balance, or Theratears)  Possible clouding of posterior capsule both eyes discussed.  Possible posterior vitreous detachment (sudden onset large floater and/or flashing lights) both eyes discussed.  Call in June 2020 for an appointment in October 2020 for Complete Exam.    Dr. Miller [phone]

## 2019-11-17 DIAGNOSIS — G60.9 IDIOPATHIC PERIPHERAL NEUROPATHY: ICD-10-CM

## 2019-11-18 RX ORDER — GABAPENTIN 800 MG/1
TABLET ORAL
Qty: 270 TABLET | Refills: 1 | OUTPATIENT
Start: 2019-11-18

## 2019-11-18 NOTE — TELEPHONE ENCOUNTER
Routing refill request to provider for review/approval because:  Drug not on the [facility] refill protocol   Nancy Tyson BSN, RN

## 2019-11-18 NOTE — TELEPHONE ENCOUNTER
Pt has not been seen in over 1 year and appears to be getting care at [facility].      No refill.

## 2019-12-31 DIAGNOSIS — L64.9 MALE PATTERN BALDNESS: ICD-10-CM

## 2019-12-31 RX ORDER — FINASTERIDE 5 MG/1
TABLET, FILM COATED ORAL
Qty: 23 TABLET | Refills: 3 | Status: SHIPPED | OUTPATIENT
Start: 2019-12-31 | End: 2020-12-22

## 2019-12-31 NOTE — TELEPHONE ENCOUNTER
Requested Prescriptions   Pending Prescriptions Disp Refills     finasteride (PROSCAR) 5 MG tablet [Pharmacy Med Name: Finasteride 5 MG Oral Tablet]  0     Sig: TAKE 1/4 (ONE-FOURTH) TABLET BY MOUTH ONCE DAILY       There is no refill protocol information for this order

## 2020-02-23 ENCOUNTER — HEALTH MAINTENANCE LETTER (OUTPATIENT)
Age: 65
End: 2020-02-23

## 2020-12-13 ENCOUNTER — HEALTH MAINTENANCE LETTER (OUTPATIENT)
Age: 65
End: 2020-12-13

## 2020-12-21 DIAGNOSIS — L64.9 MALE PATTERN BALDNESS: ICD-10-CM

## 2020-12-21 NOTE — LETTER
December 22, 2020    Navneet Toussaint  [address] [address] [place] [id number]    Dear Navneet,       We recently received a refill request for finasteride (PROSCAR) 5 MG tablet.  We have refilled this for one time only because you are due for a:    Medication check office visit      Please call at your earliest convenience so that there will not be a delay with your future refills.          Thank you,   Your [facility] Team/  [phone]

## 2020-12-22 RX ORDER — FINASTERIDE 5 MG/1
TABLET, FILM COATED ORAL
Qty: 23 TABLET | Refills: 0 | Status: SHIPPED | OUTPATIENT
Start: 2020-12-22

## 2020-12-22 NOTE — TELEPHONE ENCOUNTER
Routing refill request to provider for review/approval because:  Patient needs to be seen because it has been more than 1 year since last office visit.  Last office visit with Dr Bruno, 7/19/18, however last prescribe by care team, 12/31/19.   ? Sees provider in [facility] system, however that looks like it has been more than 1 year as well.  Please advise on refill.   Sarahi Chen RN

## 2021-01-15 ENCOUNTER — HEALTH MAINTENANCE LETTER (OUTPATIENT)
Age: 66
End: 2021-01-15

## 2021-08-18 NOTE — NURSING NOTE
Chief Complaint   Patient presents with     Pain       Initial /62  Pulse 64  Temp 97.4  F (36.3  C) (Oral)  Ht 6' (1.829 m)  Wt 189 lb (85.7 kg)  SpO2 97%  BMI 25.63 kg/m2 Estimated body mass index is 25.63 kg/(m^2) as calculated from the following:    Height as of this encounter: 6' (1.829 m).    Weight as of this encounter: 189 lb (85.7 kg).  Medication Reconciliation: complete  Guerda Laguna M.A.     High Dose Vitamin A Pregnancy And Lactation Text: High dose vitamin A therapy is contraindicated during pregnancy and breast feeding.

## 2021-09-26 ENCOUNTER — HEALTH MAINTENANCE LETTER (OUTPATIENT)
Age: 66
End: 2021-09-26

## 2022-01-16 ENCOUNTER — HEALTH MAINTENANCE LETTER (OUTPATIENT)
Age: 67
End: 2022-01-16

## 2022-03-29 NOTE — TELEPHONE ENCOUNTER
- Hold Entresto , diuretic given hypotension on presentation   -Decrease coreg to 3.125 mg bid  -Monitor vitals , volume status   -Telemetry monitoring      Patient appears to have established with a new PCP in Jan 2019.  Would you like us to refuse refill?    Marisela Soriano BSN, RN

## 2022-11-30 ENCOUNTER — TELEPHONE (OUTPATIENT)
Dept: SLEEP MEDICINE | Facility: CLINIC | Age: 67
End: 2022-11-30

## 2022-11-30 NOTE — TELEPHONE ENCOUNTER
Reason for Call:  Call back and prescription    Detailed comments: Pt called in and requesting to get a new rx as he has a recalled CPAP machine. Pt last seen 2018. Can new rx be sent for new CPAP? Please follow up with patient.    Phone Number Patient can be reached at: Cell number on file:    Telephone Information:   Mobile [phone]       Best Time: Anytime     Can we leave a detailed message on this number? YES    Call taken on 11/30/2022 at 2:51 PM by Shannan Sky

## 2022-12-23 NOTE — TELEPHONE ENCOUNTER
Patient is switching sleep medicine providers.  He needs a copy of the clinic notes from his last visit with us as well as a copy of his 2013 sleep study.  Also, needs CPAP settings which are included in the office notes. E-mailed to patient per his request at [email or web address].  Mónica Rojas CMA

## 2023-01-08 ENCOUNTER — HEALTH MAINTENANCE LETTER (OUTPATIENT)
Age: 68
End: 2023-01-08

## 2023-02-16 ENCOUNTER — E-VISIT (OUTPATIENT)
Dept: URGENT CARE | Facility: CLINIC | Age: 68
End: 2023-02-16
Payer: COMMERCIAL

## 2023-02-16 DIAGNOSIS — J01.90 ACUTE BACTERIAL SINUSITIS: Primary | ICD-10-CM

## 2023-02-16 DIAGNOSIS — B96.89 ACUTE BACTERIAL SINUSITIS: Primary | ICD-10-CM

## 2023-02-16 PROCEDURE — 99421 OL DIG E/M SVC 5-10 MIN: CPT | Performed by: NURSE PRACTITIONER

## 2023-02-16 RX ORDER — AZELASTINE 1 MG/ML
1 SPRAY, METERED NASAL 2 TIMES DAILY
Qty: 30 ML | Refills: 0 | Status: SHIPPED | OUTPATIENT
Start: 2023-02-16

## 2023-02-17 NOTE — PATIENT INSTRUCTIONS
Sinusitis (Antibiotic Treatment)    The sinuses are air-filled spaces within the bones of the face. They connect to the inside of the nose. Sinusitis is an inflammation of the tissue that lines the sinuses. Sinusitis can occur during a cold. It can also happen due to allergies to pollens and other particles in the air. Sinusitis can cause symptoms of sinus congestion and a feeling of fullness. A sinus infection causes fever, headache, and facial pain. There is often green or yellow fluid draining from the nose or into the back of the throat (post-nasal drip). You have been given antibiotics to treat this condition.   Home care    Take the full course of antibiotics as instructed. Don't stop taking them, even when you feel better.    Drink plenty of water, hot tea, and other liquids as directed by the healthcare provider. This may help thin nasal mucus. It also may help your sinuses drain fluids.    Heat may help soothe painful areas of your face. Use a towel soaked in hot water. Or,  the shower and direct the warm spray onto your face. Using a vaporizer along with a menthol rub at night may also help soothe symptoms.     An expectorant with guaifenesin may help thin nasal mucus and help your sinuses drain fluids. Talk with your provider or pharmacists before taking an over-the-counter (OTC) medicine if you have any questions about it or its side effects..    You can use an OTC decongestant, unless a similar medicine was prescribed to you. Nasal sprays work the fastest. Use one that contains phenylephrine or oxymetazoline. First blow your nose gently. Then use the spray. Don't use these medicines more often than directed on the label. If you do, your symptoms may get worse. You may also take pills that contain pseudoephedrine. Don t use products that combine multiple medicines. This is because side effects may be increased. Read labels. You can also ask the pharmacist for help. (People with high blood  pressure should not use decongestants. They can raise blood pressure.) Talk with your provider or pharmacist if you have any questions about the medicine..    OTC antihistamines may help if allergies contributed to your sinusitis. Talk with your provider or pharmacist if you have any questions about the medicine..    Don't use nasal rinses or irrigation during an acute sinus infection, unless your healthcare provider tells you to. Rinsing may spread the infection to other areas in your sinuses.    Use acetaminophen or ibuprofen to control pain, unless another pain medicine was prescribed to you. If you have chronic liver or kidney disease or ever had a stomach ulcer, talk with your healthcare provider before using these medicines. Never give aspirin to anyone under age 18 who is ill with a fever. It may cause severe liver damage.    Don't smoke. This can make symptoms worse.    Follow-up care  Follow up with your healthcare provider, or as advised.   When to seek medical advice  Call your healthcare provider if any of these occur:     Facial pain or headache that gets worse    Stiff neck    Unusual drowsiness or confusion    Swelling of your forehead or eyelids    Symptoms don't go away in 10 days    Vision problems, such as blurred or double vision    Fever of 100.4 F (38 C) or higher, or as directed by your healthcare provider  Call 911  Call 911 if any of these occur:     Seizure    Trouble breathing    Feeling dizzy or faint    Fingernails, skin or lips look blue, purple , or gray  Prevention  Here are steps you can take to help prevent an infection:     Keep good hand washing habits.    Don t have close contact with people who have sore throats, colds, or other upper respiratory infections.    Don t smoke, and stay away from secondhand smoke.    Stay up to date with of your vaccines.  [employer] last reviewed this educational content on 12/1/2019 2000-2021 [employer]. All rights reserved. This  information is not intended as a substitute for professional medical care. Always follow your healthcare professional's instructions.        Dear Navneet Toussaint    After reviewing your responses, I've been able to diagnose you with a sinus infection    Based on your responses and diagnosis, I have prescribed Augmentin and Astelin nasal spray   to treat your symptoms. I have sent this to your pharmacy.?     It is also important to stay well hydrated, get lots of rest and take over-the-counter decongestants,?tylenol?or ibuprofen if you?are able to?take those medications per your primary care provider to help relieve discomfort.?     It is important that you take?all of?your prescribed medication even if your symptoms are improving after a few doses.? Taking?all of?your medicine helps prevent the symptoms from returning.?     If your symptoms worsen, you develop severe headache, vomiting, high fever (>102), or are not improving in 7 days, please contact your primary care provider for an appointment or visit any of our convenient Walk-in Care or Urgent Care Centers to be seen which can be found on our website?here.?     Thanks again for choosing?us?as your health care partner,?   ?  Cristiane Fuentes, CNP?

## 2023-04-23 ENCOUNTER — HEALTH MAINTENANCE LETTER (OUTPATIENT)
Age: 68
End: 2023-04-23

## 2024-06-30 ENCOUNTER — HEALTH MAINTENANCE LETTER (OUTPATIENT)
Age: 69
End: 2024-06-30

## 2025-07-13 ENCOUNTER — HEALTH MAINTENANCE LETTER (OUTPATIENT)
Age: 70
End: 2025-07-13

## (undated) DEVICE — SOL WATER IRRIG 1000ML BOTTLE 07139-09

## (undated) RX ORDER — PROPOFOL 10 MG/ML
INJECTION, EMULSION INTRAVENOUS
Status: DISPENSED
Start: 2019-06-17

## (undated) RX ORDER — LIDOCAINE HYDROCHLORIDE 20 MG/ML
INJECTION, SOLUTION EPIDURAL; INFILTRATION; INTRACAUDAL; PERINEURAL
Status: DISPENSED
Start: 2019-06-17

## (undated) RX ORDER — GLYCOPYRROLATE 0.2 MG/ML
INJECTION, SOLUTION INTRAMUSCULAR; INTRAVENOUS
Status: DISPENSED
Start: 2019-06-17

## (undated) RX ORDER — ATROPA BELLADONNA AND OPIUM 16.2; 3 MG/1; MG/1
SUPPOSITORY RECTAL
Status: DISPENSED
Start: 2019-06-17

## (undated) RX ORDER — BUPIVACAINE HYDROCHLORIDE 2.5 MG/ML
INJECTION, SOLUTION EPIDURAL; INFILTRATION; INTRACAUDAL
Status: DISPENSED
Start: 2019-06-17

## (undated) RX ORDER — ONDANSETRON 2 MG/ML
INJECTION INTRAMUSCULAR; INTRAVENOUS
Status: DISPENSED
Start: 2019-06-17

## (undated) RX ORDER — FENTANYL CITRATE 50 UG/ML
INJECTION, SOLUTION INTRAMUSCULAR; INTRAVENOUS
Status: DISPENSED
Start: 2019-06-17

## (undated) RX ORDER — CEFAZOLIN SODIUM 2 G/100ML
INJECTION, SOLUTION INTRAVENOUS
Status: DISPENSED
Start: 2019-06-17

## (undated) RX ORDER — DEXAMETHASONE SODIUM PHOSPHATE 4 MG/ML
INJECTION, SOLUTION INTRA-ARTICULAR; INTRALESIONAL; INTRAMUSCULAR; INTRAVENOUS; SOFT TISSUE
Status: DISPENSED
Start: 2019-06-17

## (undated) RX ORDER — NEOSTIGMINE METHYLSULFATE 1 MG/ML
VIAL (ML) INJECTION
Status: DISPENSED
Start: 2019-06-17

## (undated) RX ORDER — HYDROMORPHONE HYDROCHLORIDE 1 MG/ML
INJECTION, SOLUTION INTRAMUSCULAR; INTRAVENOUS; SUBCUTANEOUS
Status: DISPENSED
Start: 2019-06-17